# Patient Record
Sex: FEMALE | Race: BLACK OR AFRICAN AMERICAN | Employment: PART TIME | URBAN - METROPOLITAN AREA
[De-identification: names, ages, dates, MRNs, and addresses within clinical notes are randomized per-mention and may not be internally consistent; named-entity substitution may affect disease eponyms.]

---

## 2017-02-07 ENCOUNTER — OFFICE VISIT (OUTPATIENT)
Dept: PRIMARY CARE CLINIC | Age: 72
End: 2017-02-07

## 2017-02-07 VITALS
DIASTOLIC BLOOD PRESSURE: 85 MMHG | WEIGHT: 158 LBS | HEART RATE: 69 BPM | SYSTOLIC BLOOD PRESSURE: 135 MMHG | TEMPERATURE: 97.4 F | RESPIRATION RATE: 18 BRPM | BODY MASS INDEX: 26.29 KG/M2 | OXYGEN SATURATION: 98 %

## 2017-02-07 DIAGNOSIS — R21 RASH OF HANDS: Primary | ICD-10-CM

## 2017-02-07 PROCEDURE — 3017F COLORECTAL CA SCREEN DOC REV: CPT | Performed by: INTERNAL MEDICINE

## 2017-02-07 PROCEDURE — G8399 PT W/DXA RESULTS DOCUMENT: HCPCS | Performed by: INTERNAL MEDICINE

## 2017-02-07 PROCEDURE — 4040F PNEUMOC VAC/ADMIN/RCVD: CPT | Performed by: INTERNAL MEDICINE

## 2017-02-07 PROCEDURE — G8484 FLU IMMUNIZE NO ADMIN: HCPCS | Performed by: INTERNAL MEDICINE

## 2017-02-07 PROCEDURE — 99214 OFFICE O/P EST MOD 30 MIN: CPT | Performed by: INTERNAL MEDICINE

## 2017-02-07 PROCEDURE — 3014F SCREEN MAMMO DOC REV: CPT | Performed by: INTERNAL MEDICINE

## 2017-02-07 PROCEDURE — G8420 CALC BMI NORM PARAMETERS: HCPCS | Performed by: INTERNAL MEDICINE

## 2017-02-07 PROCEDURE — 1036F TOBACCO NON-USER: CPT | Performed by: INTERNAL MEDICINE

## 2017-02-07 PROCEDURE — 1090F PRES/ABSN URINE INCON ASSESS: CPT | Performed by: INTERNAL MEDICINE

## 2017-02-07 PROCEDURE — G8427 DOCREV CUR MEDS BY ELIG CLIN: HCPCS | Performed by: INTERNAL MEDICINE

## 2017-02-07 PROCEDURE — 1123F ACP DISCUSS/DSCN MKR DOCD: CPT | Performed by: INTERNAL MEDICINE

## 2017-02-07 RX ORDER — BETAMETHASONE DIPROPIONATE 0.05 %
OINTMENT (GRAM) TOPICAL
Qty: 50 G | Refills: 1 | Status: SHIPPED | OUTPATIENT
Start: 2017-02-07 | End: 2017-12-26

## 2017-02-07 RX ORDER — HYDROXYZINE HYDROCHLORIDE 25 MG/1
25 TABLET, FILM COATED ORAL 3 TIMES DAILY PRN
Qty: 60 TABLET | Refills: 1 | Status: SHIPPED | OUTPATIENT
Start: 2017-02-07 | End: 2017-02-17

## 2017-02-07 ASSESSMENT — PATIENT HEALTH QUESTIONNAIRE - PHQ9
2. FEELING DOWN, DEPRESSED OR HOPELESS: 0
SUM OF ALL RESPONSES TO PHQ9 QUESTIONS 1 & 2: 0
SUM OF ALL RESPONSES TO PHQ QUESTIONS 1-9: 0
1. LITTLE INTEREST OR PLEASURE IN DOING THINGS: 0

## 2017-02-07 ASSESSMENT — ENCOUNTER SYMPTOMS
SHORTNESS OF BREATH: 0
GASTROINTESTINAL NEGATIVE: 1
EYES NEGATIVE: 1
COUGH: 0
NAIL CHANGES: 0
RESPIRATORY NEGATIVE: 1
RHINORRHEA: 0
SORE THROAT: 0
DIARRHEA: 0

## 2017-03-13 DIAGNOSIS — G47.00 INSOMNIA, UNSPECIFIED TYPE: ICD-10-CM

## 2017-03-14 RX ORDER — ZOLPIDEM TARTRATE 10 MG/1
TABLET ORAL
Qty: 30 TABLET | Refills: 0 | Status: SHIPPED | OUTPATIENT
Start: 2017-03-14 | End: 2017-05-18 | Stop reason: SDUPTHER

## 2017-03-23 DIAGNOSIS — M54.50 ACUTE MIDLINE LOW BACK PAIN WITHOUT SCIATICA: ICD-10-CM

## 2017-03-24 RX ORDER — IBUPROFEN 600 MG/1
TABLET ORAL
Qty: 90 TABLET | Refills: 0 | Status: SHIPPED | OUTPATIENT
Start: 2017-03-24 | End: 2017-11-27 | Stop reason: DRUGHIGH

## 2017-05-18 DIAGNOSIS — G47.00 INSOMNIA, UNSPECIFIED TYPE: ICD-10-CM

## 2017-05-18 RX ORDER — ZOLPIDEM TARTRATE 10 MG/1
TABLET ORAL
Qty: 30 TABLET | Refills: 0 | Status: SHIPPED | OUTPATIENT
Start: 2017-05-18 | End: 2017-08-10 | Stop reason: SDUPTHER

## 2017-08-10 ENCOUNTER — OFFICE VISIT (OUTPATIENT)
Dept: PRIMARY CARE CLINIC | Age: 72
End: 2017-08-10

## 2017-08-10 VITALS
DIASTOLIC BLOOD PRESSURE: 77 MMHG | BODY MASS INDEX: 28.16 KG/M2 | HEART RATE: 76 BPM | HEIGHT: 65 IN | SYSTOLIC BLOOD PRESSURE: 132 MMHG | WEIGHT: 169 LBS | OXYGEN SATURATION: 96 % | TEMPERATURE: 97.7 F

## 2017-08-10 DIAGNOSIS — M54.2 NECK PAIN: Primary | ICD-10-CM

## 2017-08-10 DIAGNOSIS — G47.00 INSOMNIA, UNSPECIFIED TYPE: ICD-10-CM

## 2017-08-10 PROCEDURE — 1123F ACP DISCUSS/DSCN MKR DOCD: CPT | Performed by: NURSE PRACTITIONER

## 2017-08-10 PROCEDURE — 3014F SCREEN MAMMO DOC REV: CPT | Performed by: NURSE PRACTITIONER

## 2017-08-10 PROCEDURE — G8427 DOCREV CUR MEDS BY ELIG CLIN: HCPCS | Performed by: NURSE PRACTITIONER

## 2017-08-10 PROCEDURE — 1090F PRES/ABSN URINE INCON ASSESS: CPT | Performed by: NURSE PRACTITIONER

## 2017-08-10 PROCEDURE — G8399 PT W/DXA RESULTS DOCUMENT: HCPCS | Performed by: NURSE PRACTITIONER

## 2017-08-10 PROCEDURE — G8419 CALC BMI OUT NRM PARAM NOF/U: HCPCS | Performed by: NURSE PRACTITIONER

## 2017-08-10 PROCEDURE — 1036F TOBACCO NON-USER: CPT | Performed by: NURSE PRACTITIONER

## 2017-08-10 PROCEDURE — 3017F COLORECTAL CA SCREEN DOC REV: CPT | Performed by: NURSE PRACTITIONER

## 2017-08-10 PROCEDURE — 99213 OFFICE O/P EST LOW 20 MIN: CPT | Performed by: NURSE PRACTITIONER

## 2017-08-10 PROCEDURE — 4040F PNEUMOC VAC/ADMIN/RCVD: CPT | Performed by: NURSE PRACTITIONER

## 2017-08-10 RX ORDER — ZOLPIDEM TARTRATE 10 MG/1
TABLET ORAL
Qty: 30 TABLET | Refills: 0 | Status: SHIPPED | OUTPATIENT
Start: 2017-08-10 | End: 2017-10-11 | Stop reason: SDUPTHER

## 2017-08-10 RX ORDER — PREDNISONE 1 MG/1
TABLET ORAL
Qty: 36 TABLET | Refills: 0 | Status: SHIPPED | OUTPATIENT
Start: 2017-08-10 | End: 2017-12-26

## 2017-08-10 RX ORDER — BACLOFEN 10 MG/1
10 TABLET ORAL 2 TIMES DAILY
Qty: 40 TABLET | Refills: 0 | Status: SHIPPED | OUTPATIENT
Start: 2017-08-10 | End: 2017-12-26

## 2017-08-10 ASSESSMENT — ENCOUNTER SYMPTOMS
NAUSEA: 0
TROUBLE SWALLOWING: 0
SORE THROAT: 0
CHEST TIGHTNESS: 0
VOMITING: 0
WHEEZING: 0
VOICE CHANGE: 0
COUGH: 0
SHORTNESS OF BREATH: 0
PHOTOPHOBIA: 0

## 2017-10-10 ENCOUNTER — TELEPHONE (OUTPATIENT)
Dept: PRIMARY CARE CLINIC | Age: 72
End: 2017-10-10

## 2017-10-10 NOTE — TELEPHONE ENCOUNTER
Pt requesting refill of zolpidem. Last refill was 8/10   Last visit 8/10         [address] [facility] and [address] in [place].  Healthy

## 2017-10-11 DIAGNOSIS — G47.00 INSOMNIA, UNSPECIFIED TYPE: ICD-10-CM

## 2017-10-11 RX ORDER — ZOLPIDEM TARTRATE 10 MG/1
TABLET ORAL
Qty: 30 TABLET | Refills: 0 | Status: SHIPPED | OUTPATIENT
Start: 2017-10-11 | End: 2017-11-27 | Stop reason: SDUPTHER

## 2017-10-25 DIAGNOSIS — L24.5 IRRITANT CONTACT DERMATITIS DUE TO OTHER CHEMICAL PRODUCTS: ICD-10-CM

## 2017-10-26 RX ORDER — CLOBETASOL PROPIONATE 0.5 MG/G
OINTMENT TOPICAL
Qty: 30 G | Refills: 0 | Status: SHIPPED | OUTPATIENT
Start: 2017-10-26 | End: 2017-12-26

## 2017-11-27 ENCOUNTER — OFFICE VISIT (OUTPATIENT)
Dept: PRIMARY CARE CLINIC | Age: 72
End: 2017-11-27

## 2017-11-27 VITALS
HEIGHT: 65 IN | DIASTOLIC BLOOD PRESSURE: 89 MMHG | WEIGHT: 173.2 LBS | OXYGEN SATURATION: 97 % | BODY MASS INDEX: 28.86 KG/M2 | SYSTOLIC BLOOD PRESSURE: 167 MMHG | HEART RATE: 66 BPM | TEMPERATURE: 97 F

## 2017-11-27 DIAGNOSIS — F43.9 FEELING STRESSED OUT: ICD-10-CM

## 2017-11-27 DIAGNOSIS — G47.00 INSOMNIA, UNSPECIFIED TYPE: ICD-10-CM

## 2017-11-27 DIAGNOSIS — M79.601 RIGHT ARM PAIN: Primary | ICD-10-CM

## 2017-11-27 PROCEDURE — 1090F PRES/ABSN URINE INCON ASSESS: CPT | Performed by: FAMILY MEDICINE

## 2017-11-27 PROCEDURE — G8399 PT W/DXA RESULTS DOCUMENT: HCPCS | Performed by: FAMILY MEDICINE

## 2017-11-27 PROCEDURE — G8419 CALC BMI OUT NRM PARAM NOF/U: HCPCS | Performed by: FAMILY MEDICINE

## 2017-11-27 PROCEDURE — G8427 DOCREV CUR MEDS BY ELIG CLIN: HCPCS | Performed by: FAMILY MEDICINE

## 2017-11-27 PROCEDURE — 1036F TOBACCO NON-USER: CPT | Performed by: FAMILY MEDICINE

## 2017-11-27 PROCEDURE — 3014F SCREEN MAMMO DOC REV: CPT | Performed by: FAMILY MEDICINE

## 2017-11-27 PROCEDURE — 1123F ACP DISCUSS/DSCN MKR DOCD: CPT | Performed by: FAMILY MEDICINE

## 2017-11-27 PROCEDURE — 3017F COLORECTAL CA SCREEN DOC REV: CPT | Performed by: FAMILY MEDICINE

## 2017-11-27 PROCEDURE — 99214 OFFICE O/P EST MOD 30 MIN: CPT | Performed by: FAMILY MEDICINE

## 2017-11-27 PROCEDURE — 4040F PNEUMOC VAC/ADMIN/RCVD: CPT | Performed by: FAMILY MEDICINE

## 2017-11-27 PROCEDURE — G8484 FLU IMMUNIZE NO ADMIN: HCPCS | Performed by: FAMILY MEDICINE

## 2017-11-27 RX ORDER — ZOLPIDEM TARTRATE 10 MG/1
TABLET ORAL
Qty: 30 TABLET | Refills: 0 | Status: SHIPPED | OUTPATIENT
Start: 2017-11-27 | End: 2018-03-06 | Stop reason: SDUPTHER

## 2017-11-27 RX ORDER — IBUPROFEN 400 MG/1
TABLET ORAL
Qty: 120 TABLET | Refills: 3 | Status: SHIPPED | OUTPATIENT
Start: 2017-11-27 | End: 2018-03-06 | Stop reason: ALTCHOICE

## 2017-11-27 ASSESSMENT — ENCOUNTER SYMPTOMS
COUGH: 0
EYE PAIN: 0
APNEA: 0
SINUS PRESSURE: 0
DIARRHEA: 0
ABDOMINAL DISTENTION: 0
TROUBLE SWALLOWING: 0
NAUSEA: 0
RECTAL PAIN: 0
VOMITING: 0
BLOOD IN STOOL: 0
STRIDOR: 0
EYE REDNESS: 0
SHORTNESS OF BREATH: 0
CHOKING: 0
EYE ITCHING: 0
WHEEZING: 0
CONSTIPATION: 0
EYE DISCHARGE: 0
SORE THROAT: 0
CHEST TIGHTNESS: 0
PHOTOPHOBIA: 0
BACK PAIN: 0
RHINORRHEA: 0
COLOR CHANGE: 0

## 2017-11-27 NOTE — PROGRESS NOTES
Gastrointestinal: Negative for abdominal distention, blood in stool, constipation, diarrhea, nausea, rectal pain and vomiting. Genitourinary: Negative for decreased urine volume, difficulty urinating, dyspareunia, dysuria, enuresis, flank pain, frequency, genital sores, hematuria, menstrual problem, pelvic pain, urgency, vaginal bleeding and vaginal pain. Musculoskeletal: Negative for arthralgias, back pain, gait problem, joint swelling, myalgias, neck pain and neck stiffness. Left arm pain   Skin: Negative for color change, pallor, rash and wound. Neurological: Negative for dizziness, tremors, seizures, syncope, facial asymmetry, speech difficulty, weakness, light-headedness, numbness and headaches. Hematological: Negative for adenopathy. Does not bruise/bleed easily. Psychiatric/Behavioral: Negative for agitation, behavioral problems, confusion, decreased concentration, dysphoric mood, hallucinations, self-injury, sleep disturbance and suicidal ideas. The patient is not nervous/anxious and is not hyperactive. Objective:   Physical Exam   Constitutional: She is oriented to person, place, and time. She appears well-developed and well-nourished. No distress. HENT:   Head: Normocephalic and atraumatic. Right Ear: External ear normal.   Left Ear: External ear normal.   Nose: Nose normal.   Mouth/Throat: Oropharynx is clear and moist. No oropharyngeal exudate. Eyes: Conjunctivae and EOM are normal. Pupils are equal, round, and reactive to light. Left eye exhibits no discharge. No scleral icterus. Neck: Normal range of motion. Neck supple. No JVD present. No tracheal deviation present. No thyromegaly present. Cardiovascular: Normal rate, regular rhythm, normal heart sounds and intact distal pulses. Exam reveals no gallop and no friction rub. No murmur heard. Pulmonary/Chest: Effort normal and breath sounds normal. No stridor. No respiratory distress. She has no wheezes.  She has no rales. She exhibits no tenderness. Abdominal: Soft. Bowel sounds are normal. She exhibits no distension and no mass. There is no tenderness. There is no rebound and no guarding. Musculoskeletal: Normal range of motion. She exhibits no edema or tenderness. Minimal tenderness of the biceps are R arm   Lymphadenopathy:     She has no cervical adenopathy. Neurological: She is alert and oriented to person, place, and time. She has normal reflexes. No cranial nerve deficit. Coordination normal.   Skin: Skin is warm and dry. No rash noted. She is not diaphoretic. No erythema. No pallor. Psychiatric: She has a normal mood and affect. Her behavior is normal. Judgment and thought content normal.   Nursing note and vitals reviewed. Assessment:      1. Right arm pain  New job 2 months  Muscular vs dvt  Add nsaid  - Ultrasound doppler venous arm right; Future  - ibuprofen (ADVIL;MOTRIN) 400 MG tablet; One tablet 3 times a day as needed  Dispense: 120 tablet; Refill: 3    2. Feeling stressed out  Recent death of   Samia King not working    Needs fu 2 weeks  - sertraline (ZOLOFT) 50 MG tablet; Take 1 tablet by mouth daily  Dispense: 30 tablet;  Refill: 3    bp up now  Normal last vist  Need fu blood pressure ck 1-2 weeks  No salt added      Plan:

## 2017-11-28 ENCOUNTER — HOSPITAL ENCOUNTER (OUTPATIENT)
Dept: VASCULAR LAB | Age: 72
Discharge: HOME | End: 2017-11-28
Attending: FAMILY MEDICINE | Admitting: FAMILY MEDICINE

## 2017-11-28 DIAGNOSIS — M79.601 PAIN OF RIGHT ARM: ICD-10-CM

## 2017-12-26 ENCOUNTER — OFFICE VISIT (OUTPATIENT)
Dept: PRIMARY CARE CLINIC | Age: 72
End: 2017-12-26

## 2017-12-26 VITALS
HEIGHT: 65 IN | TEMPERATURE: 97.3 F | OXYGEN SATURATION: 97 % | WEIGHT: 168 LBS | DIASTOLIC BLOOD PRESSURE: 90 MMHG | SYSTOLIC BLOOD PRESSURE: 160 MMHG | BODY MASS INDEX: 27.99 KG/M2 | HEART RATE: 60 BPM

## 2017-12-26 DIAGNOSIS — Z23 NEED FOR TDAP VACCINATION: ICD-10-CM

## 2017-12-26 DIAGNOSIS — Z00.00 PHYSICAL EXAM: ICD-10-CM

## 2017-12-26 DIAGNOSIS — E55.9 VITAMIN D DEFICIENCY: ICD-10-CM

## 2017-12-26 DIAGNOSIS — F41.9 ANXIETY: ICD-10-CM

## 2017-12-26 DIAGNOSIS — Z12.11 SCREEN FOR COLON CANCER: ICD-10-CM

## 2017-12-26 DIAGNOSIS — Z12.31 ENCOUNTER FOR SCREENING MAMMOGRAM FOR HIGH-RISK PATIENT: ICD-10-CM

## 2017-12-26 DIAGNOSIS — I10 ESSENTIAL HYPERTENSION: ICD-10-CM

## 2017-12-26 DIAGNOSIS — Z23 FLU VACCINE NEED: ICD-10-CM

## 2017-12-26 LAB
A/G RATIO: 1.5 (ref 1.1–2.2)
ALBUMIN SERPL-MCNC: 4.1 G/DL (ref 3.4–5)
ALP BLD-CCNC: 84 U/L (ref 40–129)
ALT SERPL-CCNC: 9 U/L (ref 10–40)
ANION GAP SERPL CALCULATED.3IONS-SCNC: 14 MMOL/L (ref 3–16)
AST SERPL-CCNC: 14 U/L (ref 15–37)
BASOPHILS ABSOLUTE: 0 K/UL (ref 0–0.2)
BASOPHILS RELATIVE PERCENT: 0.9 %
BILIRUB SERPL-MCNC: <0.2 MG/DL (ref 0–1)
BILIRUBIN URINE: NEGATIVE
BLOOD, URINE: NEGATIVE
BUN BLDV-MCNC: 17 MG/DL (ref 7–20)
CALCIUM SERPL-MCNC: 9.6 MG/DL (ref 8.3–10.6)
CHLORIDE BLD-SCNC: 105 MMOL/L (ref 99–110)
CHOLESTEROL, TOTAL: 200 MG/DL (ref 0–199)
CLARITY: CLEAR
CO2: 26 MMOL/L (ref 21–32)
COLOR: YELLOW
CREAT SERPL-MCNC: 0.8 MG/DL (ref 0.6–1.2)
EOSINOPHILS ABSOLUTE: 0.3 K/UL (ref 0–0.6)
EOSINOPHILS RELATIVE PERCENT: 5 %
GFR AFRICAN AMERICAN: >60
GFR NON-AFRICAN AMERICAN: >60
GLOBULIN: 2.7 G/DL
GLUCOSE BLD-MCNC: 77 MG/DL (ref 70–99)
GLUCOSE URINE: NEGATIVE MG/DL
HCT VFR BLD CALC: 39.5 % (ref 36–48)
HDLC SERPL-MCNC: 60 MG/DL (ref 40–60)
HEMOGLOBIN: 13 G/DL (ref 12–16)
KETONES, URINE: NEGATIVE MG/DL
LDL CHOLESTEROL CALCULATED: 122 MG/DL
LEUKOCYTE ESTERASE, URINE: NEGATIVE
LYMPHOCYTES ABSOLUTE: 2.3 K/UL (ref 1–5.1)
LYMPHOCYTES RELATIVE PERCENT: 45.4 %
MCH RBC QN AUTO: 28.8 PG (ref 26–34)
MCHC RBC AUTO-ENTMCNC: 33 G/DL (ref 31–36)
MCV RBC AUTO: 87.5 FL (ref 80–100)
MICROSCOPIC EXAMINATION: NORMAL
MONOCYTES ABSOLUTE: 0.4 K/UL (ref 0–1.3)
MONOCYTES RELATIVE PERCENT: 8.6 %
NEUTROPHILS ABSOLUTE: 2 K/UL (ref 1.7–7.7)
NEUTROPHILS RELATIVE PERCENT: 40.1 %
NITRITE, URINE: NEGATIVE
PDW BLD-RTO: 14.9 % (ref 12.4–15.4)
PH UA: 5.5
PLATELET # BLD: 223 K/UL (ref 135–450)
PMV BLD AUTO: 9.6 FL (ref 5–10.5)
POTASSIUM SERPL-SCNC: 4.5 MMOL/L (ref 3.5–5.1)
PROTEIN UA: NEGATIVE MG/DL
RBC # BLD: 4.52 M/UL (ref 4–5.2)
SODIUM BLD-SCNC: 145 MMOL/L (ref 136–145)
SPECIFIC GRAVITY UA: 1.02
TOTAL PROTEIN: 6.8 G/DL (ref 6.4–8.2)
TRIGL SERPL-MCNC: 92 MG/DL (ref 0–150)
TSH SERPL DL<=0.05 MIU/L-ACNC: 1.32 UIU/ML (ref 0.27–4.2)
URINE TYPE: NORMAL
UROBILINOGEN, URINE: 0.2 E.U./DL
VITAMIN D 25-HYDROXY: 17.5 NG/ML
VLDLC SERPL CALC-MCNC: 18 MG/DL
WBC # BLD: 5.1 K/UL (ref 4–11)

## 2017-12-26 PROCEDURE — 3014F SCREEN MAMMO DOC REV: CPT | Performed by: INTERNAL MEDICINE

## 2017-12-26 PROCEDURE — 1123F ACP DISCUSS/DSCN MKR DOCD: CPT | Performed by: INTERNAL MEDICINE

## 2017-12-26 PROCEDURE — 99214 OFFICE O/P EST MOD 30 MIN: CPT | Performed by: INTERNAL MEDICINE

## 2017-12-26 PROCEDURE — G8399 PT W/DXA RESULTS DOCUMENT: HCPCS | Performed by: INTERNAL MEDICINE

## 2017-12-26 PROCEDURE — 4040F PNEUMOC VAC/ADMIN/RCVD: CPT | Performed by: INTERNAL MEDICINE

## 2017-12-26 PROCEDURE — G8484 FLU IMMUNIZE NO ADMIN: HCPCS | Performed by: INTERNAL MEDICINE

## 2017-12-26 PROCEDURE — G8419 CALC BMI OUT NRM PARAM NOF/U: HCPCS | Performed by: INTERNAL MEDICINE

## 2017-12-26 PROCEDURE — 3017F COLORECTAL CA SCREEN DOC REV: CPT | Performed by: INTERNAL MEDICINE

## 2017-12-26 PROCEDURE — 1090F PRES/ABSN URINE INCON ASSESS: CPT | Performed by: INTERNAL MEDICINE

## 2017-12-26 PROCEDURE — G8427 DOCREV CUR MEDS BY ELIG CLIN: HCPCS | Performed by: INTERNAL MEDICINE

## 2017-12-26 PROCEDURE — 1036F TOBACCO NON-USER: CPT | Performed by: INTERNAL MEDICINE

## 2017-12-26 RX ORDER — HYDROCHLOROTHIAZIDE 25 MG/1
25 TABLET ORAL DAILY
Qty: 30 TABLET | Refills: 3 | Status: SHIPPED | OUTPATIENT
Start: 2017-12-26 | End: 2018-03-06 | Stop reason: SDUPTHER

## 2017-12-26 ASSESSMENT — ENCOUNTER SYMPTOMS
SHORTNESS OF BREATH: 0
DIARRHEA: 0
NAUSEA: 0
ABDOMINAL PAIN: 0
GASTROINTESTINAL NEGATIVE: 1
COUGH: 0
CONSTIPATION: 0
CHEST TIGHTNESS: 0
EYES NEGATIVE: 1
WHEEZING: 0
ABDOMINAL DISTENTION: 0

## 2017-12-26 NOTE — PROGRESS NOTES
84    Current Flu and Pneumonia Vax  Set targets for weight loss 4 lbs per month  Exercise 3-5 times per week  Keep check of weight  Weighting machine    On a scale of 1 to 5 how confident are you in these goals? 4/5     Education materials given

## 2017-12-27 LAB
ESTIMATED AVERAGE GLUCOSE: 116.9 MG/DL
HBA1C MFR BLD: 5.7 %

## 2017-12-27 RX ORDER — ERGOCALCIFEROL (VITAMIN D2) 1250 MCG
50000 CAPSULE ORAL WEEKLY
Qty: 4 CAPSULE | Refills: 3 | Status: SHIPPED | OUTPATIENT
Start: 2017-12-27 | End: 2018-03-06 | Stop reason: SDUPTHER

## 2018-02-01 DIAGNOSIS — R21 RASH OF HANDS: ICD-10-CM

## 2018-02-02 ENCOUNTER — TELEPHONE (OUTPATIENT)
Dept: ENT CLINIC | Age: 73
End: 2018-02-02

## 2018-02-05 RX ORDER — BETAMETHASONE DIPROPIONATE 0.05 %
OINTMENT (GRAM) TOPICAL
Qty: 45 G | Refills: 0 | Status: SHIPPED | OUTPATIENT
Start: 2018-02-05 | End: 2019-02-14

## 2018-03-06 ENCOUNTER — OFFICE VISIT (OUTPATIENT)
Dept: PRIMARY CARE CLINIC | Age: 73
End: 2018-03-06

## 2018-03-06 VITALS
SYSTOLIC BLOOD PRESSURE: 140 MMHG | BODY MASS INDEX: 28.66 KG/M2 | WEIGHT: 172 LBS | TEMPERATURE: 96.8 F | HEIGHT: 65 IN | DIASTOLIC BLOOD PRESSURE: 88 MMHG | HEART RATE: 95 BPM | RESPIRATION RATE: 16 BRPM | OXYGEN SATURATION: 97 %

## 2018-03-06 DIAGNOSIS — M79.604 PAIN OF RIGHT LOWER EXTREMITY: ICD-10-CM

## 2018-03-06 DIAGNOSIS — G89.29 CHRONIC PAIN OF RIGHT KNEE: Primary | ICD-10-CM

## 2018-03-06 DIAGNOSIS — I10 ESSENTIAL HYPERTENSION: ICD-10-CM

## 2018-03-06 DIAGNOSIS — E55.9 VITAMIN D DEFICIENCY: ICD-10-CM

## 2018-03-06 DIAGNOSIS — G47.00 INSOMNIA, UNSPECIFIED TYPE: ICD-10-CM

## 2018-03-06 DIAGNOSIS — M25.561 CHRONIC PAIN OF RIGHT KNEE: Primary | ICD-10-CM

## 2018-03-06 DIAGNOSIS — M19.079 ARTHRITIS OF ANKLE: Primary | ICD-10-CM

## 2018-03-06 DIAGNOSIS — F41.9 ANXIETY: ICD-10-CM

## 2018-03-06 PROCEDURE — 99214 OFFICE O/P EST MOD 30 MIN: CPT | Performed by: FAMILY MEDICINE

## 2018-03-06 RX ORDER — ZOLPIDEM TARTRATE 10 MG/1
TABLET ORAL
Qty: 30 TABLET | Refills: 0 | Status: SHIPPED | OUTPATIENT
Start: 2018-03-06 | End: 2018-04-06

## 2018-03-06 RX ORDER — IBUPROFEN 400 MG/1
TABLET ORAL
Qty: 120 TABLET | Refills: 3 | Status: CANCELLED | OUTPATIENT
Start: 2018-03-06

## 2018-03-06 RX ORDER — BETAMETHASONE DIPROPIONATE 0.05 %
OINTMENT (GRAM) TOPICAL
Qty: 45 G | Refills: 0 | Status: CANCELLED | OUTPATIENT
Start: 2018-03-06

## 2018-03-06 RX ORDER — ERGOCALCIFEROL (VITAMIN D2) 1250 MCG
50000 CAPSULE ORAL WEEKLY
Qty: 4 CAPSULE | Refills: 3 | Status: SHIPPED | OUTPATIENT
Start: 2018-03-06 | End: 2018-03-06 | Stop reason: SDUPTHER

## 2018-03-06 RX ORDER — HYDROCHLOROTHIAZIDE 25 MG/1
25 TABLET ORAL DAILY
Qty: 30 TABLET | Refills: 3 | Status: SHIPPED | OUTPATIENT
Start: 2018-03-06 | End: 2018-03-06 | Stop reason: SDUPTHER

## 2018-03-06 RX ORDER — METHYLPREDNISOLONE 4 MG/1
TABLET ORAL
Qty: 21 TABLET | Refills: 0 | Status: SHIPPED | OUTPATIENT
Start: 2018-03-06 | End: 2018-03-12

## 2018-03-06 RX ORDER — NAPROXEN 500 MG/1
TABLET ORAL
Qty: 60 TABLET | Refills: 3 | Status: SHIPPED | OUTPATIENT
Start: 2018-03-06 | End: 2019-02-14

## 2018-03-06 ASSESSMENT — PATIENT HEALTH QUESTIONNAIRE - PHQ9
2. FEELING DOWN, DEPRESSED OR HOPELESS: 0
SUM OF ALL RESPONSES TO PHQ9 QUESTIONS 1 & 2: 0
1. LITTLE INTEREST OR PLEASURE IN DOING THINGS: 0
SUM OF ALL RESPONSES TO PHQ QUESTIONS 1-9: 0

## 2018-03-06 ASSESSMENT — ENCOUNTER SYMPTOMS
CHOKING: 0
RHINORRHEA: 0
EYE ITCHING: 0
PHOTOPHOBIA: 0
CONSTIPATION: 0
EYE PAIN: 0
NAUSEA: 0
SORE THROAT: 0
ABDOMINAL DISTENTION: 0
COLOR CHANGE: 0
WHEEZING: 0
SINUS PRESSURE: 0
BACK PAIN: 0
EYE DISCHARGE: 0
APNEA: 0
RECTAL PAIN: 0
BLOOD IN STOOL: 0
SHORTNESS OF BREATH: 0
EYE REDNESS: 0
VOMITING: 0
DIARRHEA: 0
CHEST TIGHTNESS: 0
COUGH: 0
STRIDOR: 0
TROUBLE SWALLOWING: 0

## 2018-03-06 NOTE — PROGRESS NOTES
enuresis, flank pain, frequency, genital sores, hematuria, menstrual problem, pelvic pain, urgency, vaginal bleeding and vaginal pain. Musculoskeletal: Negative for arthralgias, back pain, gait problem, joint swelling, myalgias, neck pain and neck stiffness. Right knee and leg pain   Skin: Negative for color change, pallor, rash and wound. Neurological: Negative for dizziness, tremors, seizures, syncope, facial asymmetry, speech difficulty, weakness, light-headedness, numbness and headaches. Hematological: Negative for adenopathy. Does not bruise/bleed easily. Psychiatric/Behavioral: Negative for agitation, behavioral problems, confusion, decreased concentration, dysphoric mood, hallucinations, self-injury, sleep disturbance and suicidal ideas. The patient is not nervous/anxious and is not hyperactive. Objective:   Physical Exam   Constitutional: She is oriented to person, place, and time. She appears well-developed and well-nourished. No distress. HENT:   Head: Normocephalic and atraumatic. Right Ear: External ear normal.   Left Ear: External ear normal.   Nose: Nose normal.   Mouth/Throat: Oropharynx is clear and moist. No oropharyngeal exudate. Eyes: Conjunctivae and EOM are normal. Pupils are equal, round, and reactive to light. Left eye exhibits no discharge. No scleral icterus. Neck: Normal range of motion. Neck supple. No JVD present. No tracheal deviation present. No thyromegaly present. Cardiovascular: Normal rate, regular rhythm, normal heart sounds and intact distal pulses. Exam reveals no gallop and no friction rub. No murmur heard. Pulmonary/Chest: Effort normal and breath sounds normal. No stridor. No respiratory distress. She has no wheezes. She has no rales. She exhibits no tenderness. Abdominal: Soft. Bowel sounds are normal. She exhibits no distension and no mass. There is no tenderness. There is no rebound and no guarding.    Musculoskeletal: Normal range of

## 2018-03-07 RX ORDER — HYDROCHLOROTHIAZIDE 25 MG/1
25 TABLET ORAL DAILY
Qty: 90 TABLET | Refills: 1 | Status: SHIPPED | OUTPATIENT
Start: 2018-03-07 | End: 2019-02-14 | Stop reason: SDUPTHER

## 2018-03-07 RX ORDER — ERGOCALCIFEROL 1.25 MG/1
CAPSULE ORAL
Qty: 12 CAPSULE | Refills: 1 | Status: SHIPPED | OUTPATIENT
Start: 2018-03-07 | End: 2019-02-14

## 2018-04-10 ENCOUNTER — TELEPHONE (OUTPATIENT)
Dept: PRIMARY CARE CLINIC | Age: 73
End: 2018-04-10

## 2018-04-10 ENCOUNTER — OFFICE VISIT (OUTPATIENT)
Dept: PRIMARY CARE CLINIC | Age: 73
End: 2018-04-10

## 2018-04-10 VITALS
SYSTOLIC BLOOD PRESSURE: 120 MMHG | HEIGHT: 65 IN | DIASTOLIC BLOOD PRESSURE: 70 MMHG | WEIGHT: 165 LBS | OXYGEN SATURATION: 98 % | BODY MASS INDEX: 27.49 KG/M2 | TEMPERATURE: 97.3 F | HEART RATE: 86 BPM

## 2018-04-10 DIAGNOSIS — J06.9 ACUTE URI: ICD-10-CM

## 2018-04-10 DIAGNOSIS — Z23 NEED FOR SHINGLES VACCINE: ICD-10-CM

## 2018-04-10 PROCEDURE — 99213 OFFICE O/P EST LOW 20 MIN: CPT | Performed by: INTERNAL MEDICINE

## 2018-04-10 RX ORDER — AZITHROMYCIN 250 MG/1
TABLET, FILM COATED ORAL
Qty: 1 PACKET | Refills: 0 | Status: SHIPPED | OUTPATIENT
Start: 2018-04-10 | End: 2018-04-20

## 2018-04-10 RX ORDER — GUAIFENESIN 600 MG/1
600 TABLET, EXTENDED RELEASE ORAL 2 TIMES DAILY
Qty: 12 TABLET | Refills: 0 | Status: SHIPPED | OUTPATIENT
Start: 2018-04-10 | End: 2019-02-14

## 2018-04-10 RX ORDER — FLUTICASONE PROPIONATE 50 MCG
1 SPRAY, SUSPENSION (ML) NASAL DAILY
Qty: 1 BOTTLE | Refills: 0 | Status: SHIPPED | OUTPATIENT
Start: 2018-04-10 | End: 2019-02-14

## 2018-04-10 ASSESSMENT — ENCOUNTER SYMPTOMS
COUGH: 1
GASTROINTESTINAL NEGATIVE: 1
ABDOMINAL PAIN: 0
WHEEZING: 0
NAUSEA: 0
RHINORRHEA: 1
EYES NEGATIVE: 1
SHORTNESS OF BREATH: 0
ABDOMINAL DISTENTION: 0
CONSTIPATION: 0
DIARRHEA: 0
CHEST TIGHTNESS: 0
SINUS PRESSURE: 1

## 2018-06-06 ENCOUNTER — TELEPHONE (OUTPATIENT)
Dept: PRIMARY CARE CLINIC | Age: 73
End: 2018-06-06

## 2018-06-12 ENCOUNTER — TELEPHONE (OUTPATIENT)
Dept: PRIMARY CARE CLINIC | Age: 73
End: 2018-06-12

## 2018-06-19 ENCOUNTER — TELEPHONE (OUTPATIENT)
Dept: PRIMARY CARE CLINIC | Age: 73
End: 2018-06-19

## 2018-06-25 ENCOUNTER — HOSPITAL ENCOUNTER (OUTPATIENT)
Dept: MAMMOGRAPHY | Age: 73
Discharge: HOME | End: 2018-06-26
Attending: INTERNAL MEDICINE | Admitting: INTERNAL MEDICINE

## 2018-06-25 DIAGNOSIS — Z12.31 VISIT FOR SCREENING MAMMOGRAM: ICD-10-CM

## 2018-07-06 ENCOUNTER — TELEPHONE (OUTPATIENT)
Dept: PRIMARY CARE CLINIC | Age: 73
End: 2018-07-06

## 2018-09-04 ENCOUNTER — TELEPHONE (OUTPATIENT)
Dept: PRIMARY CARE CLINIC | Age: 73
End: 2018-09-04

## 2018-09-04 DIAGNOSIS — M17.0 PRIMARY OSTEOARTHRITIS OF BOTH KNEES: Primary | ICD-10-CM

## 2018-09-05 ENCOUNTER — OFFICE VISIT (OUTPATIENT)
Dept: ORTHOPEDIC SURGERY | Age: 73
End: 2018-09-05

## 2018-09-05 VITALS
TEMPERATURE: 97.8 F | BODY MASS INDEX: 29.16 KG/M2 | SYSTOLIC BLOOD PRESSURE: 176 MMHG | HEIGHT: 65 IN | DIASTOLIC BLOOD PRESSURE: 90 MMHG | HEART RATE: 60 BPM | WEIGHT: 175 LBS

## 2018-09-05 DIAGNOSIS — M25.561 PAIN IN BOTH KNEES, UNSPECIFIED CHRONICITY: Primary | ICD-10-CM

## 2018-09-05 DIAGNOSIS — M17.0 ARTHRITIS OF BOTH KNEES: ICD-10-CM

## 2018-09-05 DIAGNOSIS — M25.562 PAIN IN BOTH KNEES, UNSPECIFIED CHRONICITY: Primary | ICD-10-CM

## 2018-09-05 PROCEDURE — 99203 OFFICE O/P NEW LOW 30 MIN: CPT | Performed by: PHYSICIAN ASSISTANT

## 2018-09-05 PROCEDURE — 20610 DRAIN/INJ JOINT/BURSA W/O US: CPT | Performed by: PHYSICIAN ASSISTANT

## 2018-09-05 NOTE — PROGRESS NOTES
Examination of the lower extremities shows intact perfusion to all extremities. No cyanosis. Digits are warm to touch, capillary refill is less than 2 seconds. no edema noted. Examination of the skin over both lower extremities reveals: The skin to be intact without lacerations or abrasions. No significant erythema. No rashes or skin lesions. X Rays: performed in the office today:   AP Standing, Lateral and Sunrise views of bilateral knee: There is mild osteoarthritic findings of the left and right knee noted with joint space narrowing of the medial and lateral compartments. Early sclerosis. Chondrocalcinosis noted on her right lateral joint line. No acute fractures or dislocations noted. Additional Tests reviewed: none  Additional Outside Records reviewed: none    Diagnosis:       ICD-10-CM ICD-9-CM    1. Pain in both knees, unspecified chronicity M25.561 719.46 XR Knee Bilateral Standing    M25.562  XR KNEE LEFT (1-2 VIEWS)      XR KNEE RIGHT (1-2 VIEWS)   2. Arthritis of both knees M17.0 716.96 NC ARTHROCENTESIS ASPIR&/INJ MAJOR JT/BURSA W/O US      NC ARTHROCENTESIS ASPIR&/INJ MAJOR JT/BURSA W/O US      NC TRIAMCINOLONE ACETONIDE INJ        Assessment and Plan:     The natural history of the patient's diagnosis as well as the treatment options were discussed in full and questions were answered. Risks and benefits of the treatment options also reviewed in detail. She has mild degenerative arthritis of both left and right knee. She has chondrocalcinosis of the lateral meniscus right knee. Weight loss, activity modification, home exercise therapy program, NSAID'S, dietary changes have been discussed as a means to help control the symptoms. Non surgical options including cortisone injection, Visco supplementation injection,  Coolief (cooled frequency ablation of the geniculate nerves), stem cell injections, PRP injections were discussed.     Surgical option, arthroplasty

## 2018-11-07 ENCOUNTER — TELEPHONE (OUTPATIENT)
Dept: PRIMARY CARE CLINIC | Age: 73
End: 2018-11-07

## 2018-12-28 ENCOUNTER — APPOINTMENT (OUTPATIENT)
Dept: CT IMAGING | Age: 73
End: 2018-12-28
Payer: MEDICARE

## 2018-12-28 ENCOUNTER — HOSPITAL ENCOUNTER (EMERGENCY)
Age: 73
Discharge: HOME | End: 2018-12-28
Payer: MEDICARE

## 2018-12-28 ENCOUNTER — APPOINTMENT (OUTPATIENT)
Dept: GENERAL RADIOLOGY | Age: 73
End: 2018-12-28
Payer: MEDICARE

## 2018-12-28 VITALS
HEIGHT: 65 IN | WEIGHT: 172 LBS | DIASTOLIC BLOOD PRESSURE: 92 MMHG | HEART RATE: 90 BPM | OXYGEN SATURATION: 96 % | RESPIRATION RATE: 17 BRPM | SYSTOLIC BLOOD PRESSURE: 151 MMHG | TEMPERATURE: 98.5 F | BODY MASS INDEX: 28.66 KG/M2
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
## 2018-12-28 DIAGNOSIS — S16.1XXA STRAIN OF NECK MUSCLE, INITIAL ENCOUNTER: ICD-10-CM

## 2018-12-28 DIAGNOSIS — S09.90XA INJURY OF HEAD, INITIAL ENCOUNTER: ICD-10-CM

## 2018-12-28 PROCEDURE — 73030 X-RAY EXAM OF SHOULDER: CPT

## 2018-12-28 PROCEDURE — 72125 CT NECK SPINE W/O DYE: CPT

## 2018-12-28 PROCEDURE — 70450 CT HEAD/BRAIN W/O DYE: CPT

## 2018-12-28 PROCEDURE — 99284 EMERGENCY DEPT VISIT MOD MDM: CPT

## 2018-12-28 PROCEDURE — 6370000000 HC RX 637 (ALT 250 FOR IP): Performed by: PHYSICIAN ASSISTANT

## 2018-12-28 RX ORDER — CYCLOBENZAPRINE HCL 5 MG
5-10 TABLET ORAL 3 TIMES DAILY PRN
Qty: 12 TABLET | Refills: 0 | Status: SHIPPED | OUTPATIENT
Start: 2018-12-28 | End: 2019-01-07

## 2018-12-28 RX ORDER — LIDOCAINE 50 MG/G
1 PATCH TOPICAL DAILY
Qty: 30 PATCH | Refills: 0 | Status: SHIPPED | OUTPATIENT
Start: 2018-12-28 | End: 2019-02-14

## 2018-12-28 RX ORDER — LIDOCAINE 4 G/G
1 PATCH TOPICAL DAILY
Status: DISCONTINUED | OUTPATIENT
Start: 2018-12-28 | End: 2018-12-28 | Stop reason: HOSPADM

## 2018-12-28 RX ORDER — NAPROXEN 250 MG/1
500 TABLET ORAL ONCE
Status: COMPLETED | OUTPATIENT
Start: 2018-12-28 | End: 2018-12-28

## 2018-12-28 RX ORDER — HYDROCODONE BITARTRATE AND ACETAMINOPHEN 5; 325 MG/1; MG/1
1 TABLET ORAL EVERY 6 HOURS PRN
Qty: 6 TABLET | Refills: 0 | Status: SHIPPED | OUTPATIENT
Start: 2018-12-28 | End: 2018-12-31

## 2018-12-28 RX ADMIN — NAPROXEN 500 MG: 250 TABLET ORAL at 16:48

## 2018-12-28 ASSESSMENT — ENCOUNTER SYMPTOMS
VOMITING: 0
COUGH: 0
RHINORRHEA: 0
SHORTNESS OF BREATH: 0
WHEEZING: 0
NAUSEA: 0
ABDOMINAL PAIN: 0
DIARRHEA: 0

## 2018-12-28 ASSESSMENT — PAIN DESCRIPTION - PAIN TYPE: TYPE: ACUTE PAIN

## 2018-12-28 ASSESSMENT — PAIN SCALES - GENERAL
PAINLEVEL_OUTOF10: 10
PAINLEVEL_OUTOF10: 10

## 2018-12-28 ASSESSMENT — PAIN DESCRIPTION - LOCATION: LOCATION: HEAD;SHOULDER;NECK

## 2018-12-28 ASSESSMENT — PAIN DESCRIPTION - ORIENTATION: ORIENTATION: LEFT

## 2019-02-13 ENCOUNTER — TELEPHONE (OUTPATIENT)
Dept: PRIMARY CARE CLINIC | Age: 74
End: 2019-02-13

## 2019-02-14 ENCOUNTER — OFFICE VISIT (OUTPATIENT)
Dept: PRIMARY CARE CLINIC | Age: 74
End: 2019-02-14
Payer: MEDICARE

## 2019-02-14 VITALS
HEIGHT: 65 IN | TEMPERATURE: 97.5 F | OXYGEN SATURATION: 100 % | HEART RATE: 67 BPM | DIASTOLIC BLOOD PRESSURE: 80 MMHG | BODY MASS INDEX: 28.32 KG/M2 | WEIGHT: 170 LBS | SYSTOLIC BLOOD PRESSURE: 130 MMHG

## 2019-02-14 DIAGNOSIS — Z12.31 ENCOUNTER FOR SCREENING MAMMOGRAM FOR HIGH-RISK PATIENT: ICD-10-CM

## 2019-02-14 DIAGNOSIS — G47.09 OTHER INSOMNIA: ICD-10-CM

## 2019-02-14 DIAGNOSIS — R07.82 INTERCOSTAL PAIN: ICD-10-CM

## 2019-02-14 DIAGNOSIS — I10 ESSENTIAL HYPERTENSION: ICD-10-CM

## 2019-02-14 PROCEDURE — 99214 OFFICE O/P EST MOD 30 MIN: CPT | Performed by: INTERNAL MEDICINE

## 2019-02-14 RX ORDER — TIZANIDINE 2 MG/1
2 TABLET ORAL NIGHTLY PRN
Qty: 10 TABLET | Refills: 0 | Status: SHIPPED | OUTPATIENT
Start: 2019-02-14 | End: 2019-03-05

## 2019-02-14 RX ORDER — MELOXICAM 15 MG/1
15 TABLET ORAL DAILY
Qty: 30 TABLET | Refills: 0 | Status: SHIPPED | OUTPATIENT
Start: 2019-02-14 | End: 2019-03-05

## 2019-02-14 RX ORDER — HYDROCHLOROTHIAZIDE 25 MG/1
25 TABLET ORAL DAILY
Qty: 90 TABLET | Refills: 5 | Status: SHIPPED | OUTPATIENT
Start: 2019-02-14 | End: 2019-03-05 | Stop reason: SDUPTHER

## 2019-02-14 ASSESSMENT — PATIENT HEALTH QUESTIONNAIRE - PHQ9
SUM OF ALL RESPONSES TO PHQ9 QUESTIONS 1 & 2: 0
SUM OF ALL RESPONSES TO PHQ QUESTIONS 1-9: 0
2. FEELING DOWN, DEPRESSED OR HOPELESS: 0
SUM OF ALL RESPONSES TO PHQ QUESTIONS 1-9: 0
1. LITTLE INTEREST OR PLEASURE IN DOING THINGS: 0

## 2019-02-14 ASSESSMENT — ENCOUNTER SYMPTOMS
CHEST TIGHTNESS: 0
ABDOMINAL DISTENTION: 0
CONSTIPATION: 0
WHEEZING: 0
DIARRHEA: 0
NAUSEA: 0
GASTROINTESTINAL NEGATIVE: 1
BACK PAIN: 1
ABDOMINAL PAIN: 0
EYES NEGATIVE: 1
SHORTNESS OF BREATH: 0

## 2019-03-05 ENCOUNTER — OFFICE VISIT (OUTPATIENT)
Dept: PRIMARY CARE CLINIC | Age: 74
End: 2019-03-05
Payer: MEDICARE

## 2019-03-05 VITALS
DIASTOLIC BLOOD PRESSURE: 90 MMHG | WEIGHT: 170 LBS | BODY MASS INDEX: 28.32 KG/M2 | HEART RATE: 64 BPM | HEIGHT: 65 IN | SYSTOLIC BLOOD PRESSURE: 140 MMHG

## 2019-03-05 DIAGNOSIS — E55.9 VITAMIN D DEFICIENCY: ICD-10-CM

## 2019-03-05 DIAGNOSIS — Z12.11 SCREEN FOR COLON CANCER: ICD-10-CM

## 2019-03-05 DIAGNOSIS — G47.00 INSOMNIA, UNSPECIFIED TYPE: ICD-10-CM

## 2019-03-05 DIAGNOSIS — I10 ESSENTIAL HYPERTENSION: ICD-10-CM

## 2019-03-05 DIAGNOSIS — M81.0 AGE-RELATED OSTEOPOROSIS WITHOUT CURRENT PATHOLOGICAL FRACTURE: ICD-10-CM

## 2019-03-05 DIAGNOSIS — Z12.31 ENCOUNTER FOR SCREENING MAMMOGRAM FOR HIGH-RISK PATIENT: ICD-10-CM

## 2019-03-05 DIAGNOSIS — Z12.4 SCREENING FOR CERVICAL CANCER: ICD-10-CM

## 2019-03-05 DIAGNOSIS — Z00.00 PHYSICAL EXAM: Primary | ICD-10-CM

## 2019-03-05 DIAGNOSIS — Z00.00 PHYSICAL EXAM: ICD-10-CM

## 2019-03-05 DIAGNOSIS — R20.2 TINGLING OF LEFT UPPER EXTREMITY: ICD-10-CM

## 2019-03-05 LAB
A/G RATIO: 1.7 (ref 1.1–2.2)
ALBUMIN SERPL-MCNC: 4.3 G/DL (ref 3.4–5)
ALP BLD-CCNC: 91 U/L (ref 40–129)
ALT SERPL-CCNC: 12 U/L (ref 10–40)
ANION GAP SERPL CALCULATED.3IONS-SCNC: 16 MMOL/L (ref 3–16)
AST SERPL-CCNC: 16 U/L (ref 15–37)
BASOPHILS ABSOLUTE: 0.1 K/UL (ref 0–0.2)
BASOPHILS RELATIVE PERCENT: 1.7 %
BILIRUB SERPL-MCNC: <0.2 MG/DL (ref 0–1)
BILIRUBIN URINE: NEGATIVE
BLOOD, URINE: NEGATIVE
BUN BLDV-MCNC: 19 MG/DL (ref 7–20)
CALCIUM SERPL-MCNC: 9.9 MG/DL (ref 8.3–10.6)
CHLORIDE BLD-SCNC: 101 MMOL/L (ref 99–110)
CHOLESTEROL, TOTAL: 201 MG/DL (ref 0–199)
CLARITY: CLEAR
CO2: 25 MMOL/L (ref 21–32)
COLOR: YELLOW
CREAT SERPL-MCNC: 0.8 MG/DL (ref 0.6–1.2)
EOSINOPHILS ABSOLUTE: 0.1 K/UL (ref 0–0.6)
EOSINOPHILS RELATIVE PERCENT: 1.9 %
EPITHELIAL CELLS, UA: 1 /HPF (ref 0–5)
GFR AFRICAN AMERICAN: >60
GFR NON-AFRICAN AMERICAN: >60
GLOBULIN: 2.6 G/DL
GLUCOSE BLD-MCNC: 92 MG/DL (ref 70–99)
GLUCOSE URINE: NEGATIVE MG/DL
HCT VFR BLD CALC: 38.6 % (ref 36–48)
HDLC SERPL-MCNC: 69 MG/DL (ref 40–60)
HEMOGLOBIN: 12.8 G/DL (ref 12–16)
HYALINE CASTS: 1 /LPF (ref 0–8)
KETONES, URINE: NEGATIVE MG/DL
LDL CHOLESTEROL CALCULATED: 117 MG/DL
LEUKOCYTE ESTERASE, URINE: ABNORMAL
LYMPHOCYTES ABSOLUTE: 2.3 K/UL (ref 1–5.1)
LYMPHOCYTES RELATIVE PERCENT: 42.3 %
MCH RBC QN AUTO: 28.9 PG (ref 26–34)
MCHC RBC AUTO-ENTMCNC: 33.2 G/DL (ref 31–36)
MCV RBC AUTO: 87.2 FL (ref 80–100)
MICROSCOPIC EXAMINATION: YES
MONOCYTES ABSOLUTE: 0.3 K/UL (ref 0–1.3)
MONOCYTES RELATIVE PERCENT: 6.1 %
NEUTROPHILS ABSOLUTE: 2.6 K/UL (ref 1.7–7.7)
NEUTROPHILS RELATIVE PERCENT: 48 %
NITRITE, URINE: NEGATIVE
PDW BLD-RTO: 14.4 % (ref 12.4–15.4)
PH UA: 7 (ref 5–8)
PLATELET # BLD: 217 K/UL (ref 135–450)
PMV BLD AUTO: 9.2 FL (ref 5–10.5)
POTASSIUM SERPL-SCNC: 4.8 MMOL/L (ref 3.5–5.1)
PROTEIN UA: NEGATIVE MG/DL
RBC # BLD: 4.42 M/UL (ref 4–5.2)
RBC UA: 2 /HPF (ref 0–4)
SODIUM BLD-SCNC: 142 MMOL/L (ref 136–145)
SPECIFIC GRAVITY UA: 1.02 (ref 1–1.03)
TOTAL PROTEIN: 6.9 G/DL (ref 6.4–8.2)
TRIGL SERPL-MCNC: 76 MG/DL (ref 0–150)
TSH SERPL DL<=0.05 MIU/L-ACNC: 2.66 UIU/ML (ref 0.27–4.2)
URINE TYPE: ABNORMAL
UROBILINOGEN, URINE: 1 E.U./DL
VITAMIN D 25-HYDROXY: 17.2 NG/ML
VLDLC SERPL CALC-MCNC: 15 MG/DL
WBC # BLD: 5.5 K/UL (ref 4–11)
WBC UA: 2 /HPF (ref 0–5)

## 2019-03-05 PROCEDURE — 99214 OFFICE O/P EST MOD 30 MIN: CPT | Performed by: INTERNAL MEDICINE

## 2019-03-05 RX ORDER — TRAZODONE HYDROCHLORIDE 50 MG/1
50 TABLET ORAL NIGHTLY
Qty: 30 TABLET | Refills: 1 | Status: SHIPPED | OUTPATIENT
Start: 2019-03-05 | End: 2021-04-09 | Stop reason: SDUPTHER

## 2019-03-05 RX ORDER — HYDROCHLOROTHIAZIDE 25 MG/1
25 TABLET ORAL DAILY
Qty: 90 TABLET | Refills: 5 | Status: SHIPPED | OUTPATIENT
Start: 2019-03-05 | End: 2019-04-10 | Stop reason: SDUPTHER

## 2019-03-05 ASSESSMENT — ENCOUNTER SYMPTOMS
SHORTNESS OF BREATH: 0
WHEEZING: 0
GASTROINTESTINAL NEGATIVE: 1
ABDOMINAL PAIN: 0
CHEST TIGHTNESS: 0
ABDOMINAL DISTENTION: 0
DIARRHEA: 0
EYES NEGATIVE: 1
CONSTIPATION: 0
NAUSEA: 0
BACK PAIN: 1

## 2019-03-06 ENCOUNTER — TELEPHONE (OUTPATIENT)
Dept: PRIMARY CARE CLINIC | Age: 74
End: 2019-03-06

## 2019-03-06 LAB
ESTIMATED AVERAGE GLUCOSE: 119.8 MG/DL
HBA1C MFR BLD: 5.8 %

## 2019-03-21 DIAGNOSIS — E55.9 VITAMIN D DEFICIENCY: ICD-10-CM

## 2019-03-21 RX ORDER — ERGOCALCIFEROL 1.25 MG/1
CAPSULE ORAL
Qty: 12 CAPSULE | Refills: 1 | Status: SHIPPED | OUTPATIENT
Start: 2019-03-21 | End: 2019-10-24

## 2019-03-26 RX ORDER — ERGOCALCIFEROL 1.25 MG/1
CAPSULE ORAL
Qty: 4 CAPSULE | Refills: 0 | Status: SHIPPED | OUTPATIENT
Start: 2019-03-26 | End: 2019-10-24

## 2019-04-10 ENCOUNTER — OFFICE VISIT (OUTPATIENT)
Dept: PRIMARY CARE CLINIC | Age: 74
End: 2019-04-10
Payer: MEDICARE

## 2019-04-10 VITALS
HEART RATE: 67 BPM | HEIGHT: 65 IN | WEIGHT: 173 LBS | SYSTOLIC BLOOD PRESSURE: 120 MMHG | DIASTOLIC BLOOD PRESSURE: 88 MMHG | BODY MASS INDEX: 28.82 KG/M2

## 2019-04-10 DIAGNOSIS — L24.5 IRRITANT CONTACT DERMATITIS DUE TO OTHER CHEMICAL PRODUCTS: ICD-10-CM

## 2019-04-10 DIAGNOSIS — R21 RASH AND NONSPECIFIC SKIN ERUPTION: ICD-10-CM

## 2019-04-10 DIAGNOSIS — R20.2 TINGLING OF LEFT UPPER EXTREMITY: ICD-10-CM

## 2019-04-10 DIAGNOSIS — I10 ESSENTIAL HYPERTENSION: ICD-10-CM

## 2019-04-10 DIAGNOSIS — E55.9 VITAMIN D DEFICIENCY: ICD-10-CM

## 2019-04-10 DIAGNOSIS — R21 RASH OF HANDS: ICD-10-CM

## 2019-04-10 DIAGNOSIS — M17.11 PRIMARY OSTEOARTHRITIS OF RIGHT KNEE: ICD-10-CM

## 2019-04-10 PROCEDURE — 99214 OFFICE O/P EST MOD 30 MIN: CPT | Performed by: INTERNAL MEDICINE

## 2019-04-10 RX ORDER — METHYLPREDNISOLONE 4 MG/1
TABLET ORAL
Qty: 21 TABLET | Refills: 0 | Status: SHIPPED | OUTPATIENT
Start: 2019-04-10 | End: 2019-04-16

## 2019-04-10 RX ORDER — DIAPER,BRIEF,INFANT-TODD,DISP
EACH MISCELLANEOUS
Qty: 1 TUBE | Refills: 3 | Status: SHIPPED | OUTPATIENT
Start: 2019-04-10 | End: 2020-08-17 | Stop reason: SDUPTHER

## 2019-04-10 RX ORDER — HYDROCHLOROTHIAZIDE 25 MG/1
25 TABLET ORAL DAILY
Qty: 90 TABLET | Refills: 5 | Status: SHIPPED | OUTPATIENT
Start: 2019-04-10 | End: 2020-10-14 | Stop reason: SDUPTHER

## 2019-04-10 ASSESSMENT — ENCOUNTER SYMPTOMS
ABDOMINAL PAIN: 0
SHORTNESS OF BREATH: 0
EYES NEGATIVE: 1
NAUSEA: 0
DIARRHEA: 0
CHEST TIGHTNESS: 0
GASTROINTESTINAL NEGATIVE: 1
ABDOMINAL DISTENTION: 0
CONSTIPATION: 0
BACK PAIN: 1
WHEEZING: 0

## 2019-04-10 NOTE — PROGRESS NOTES
Subjective:      Patient ID: Chad Navarro is a 75 y.o. female. 4/10/19 Patient presents with:  Leg Pain: whole rt leg hurts  Hypertension: no issues  Tingling: never went forr eMG  Rash        Seen in ER [facility] 4/6/19  For same        Last seen  3/5/19 Patient presents with:  Check-Up: tingling left arm continues . No numbness . no pain                 Works regularly ; [employer] keeping / cleaning           Leg Pain    Associated symptoms include numbness (and tingling left arm). \"          Review of Systems   Constitutional: Negative for activity change, appetite change, fatigue, fever and unexpected weight change. Pneumonia Vac 2/16 ; 12/16     Flu vac  10/18    Tdap 12/17    Zostavac/ script    HENT:        Top dentures    Eyes: Negative. Negative for visual disturbance. Wears glasses ; Eye exam 1/19   Respiratory: Negative for chest tightness, shortness of breath and wheezing. Does not smoke ( stopped 1998 ) . No Etoh      Cardiovascular:        HTN 1 yr     FH of HTN ; Son had MI    Gastrointestinal: Negative. Negative for abdominal distention, abdominal pain, constipation, diarrhea and nausea. [facility]oscopy 2013 at [facility]   No FH of Ca colon    Endocrine:        FH of diabetes    Genitourinary: Negative for dysuria, frequency, menstrual problem, urgency and vaginal discharge. Natural Menopause   Last pelvic/ Pap 2013    Mammogram  3/19   Musculoskeletal: Positive for arthralgias (rt leg) and back pain. DEXA 4/15 . Nl    Skin: Positive for rash. Allergic/Immunologic: Negative for food allergies. Neurological: Positive for numbness (and tingling left arm). Negative for dizziness, weakness and headaches. Psychiatric/Behavioral: Positive for sleep disturbance. Negative for behavioral problems. Objective:   Physical Exam   Constitutional: She is oriented to person, place, and time. Eyes: Conjunctivae are normal.   Neck: Neck supple.    Cardiovascular:

## 2019-04-11 ENCOUNTER — TELEPHONE (OUTPATIENT)
Dept: PAIN MANAGEMENT | Age: 74
End: 2019-04-11

## 2019-04-11 RX ORDER — BETAMETHASONE DIPROPIONATE 0.05 %
OINTMENT (GRAM) TOPICAL
Qty: 45 G | Refills: 0 | Status: SHIPPED | OUTPATIENT
Start: 2019-04-11 | End: 2019-09-30 | Stop reason: SDUPTHER

## 2019-04-11 RX ORDER — CLOBETASOL PROPIONATE 0.5 MG/G
OINTMENT TOPICAL
Qty: 30 G | Refills: 3 | Status: SHIPPED | OUTPATIENT
Start: 2019-04-11 | End: 2020-09-04 | Stop reason: DRUGHIGH

## 2019-04-16 ENCOUNTER — OFFICE VISIT (OUTPATIENT)
Dept: ORTHOPEDIC SURGERY | Age: 74
End: 2019-04-16
Payer: MEDICARE

## 2019-04-16 VITALS
DIASTOLIC BLOOD PRESSURE: 81 MMHG | WEIGHT: 173 LBS | SYSTOLIC BLOOD PRESSURE: 120 MMHG | HEIGHT: 65 IN | HEART RATE: 72 BPM | BODY MASS INDEX: 28.82 KG/M2

## 2019-04-16 DIAGNOSIS — M17.11 PRIMARY OSTEOARTHRITIS OF RIGHT KNEE: Primary | ICD-10-CM

## 2019-04-16 PROCEDURE — 99214 OFFICE O/P EST MOD 30 MIN: CPT | Performed by: ORTHOPAEDIC SURGERY

## 2019-04-16 PROCEDURE — 20610 DRAIN/INJ JOINT/BURSA W/O US: CPT | Performed by: ORTHOPAEDIC SURGERY

## 2019-04-16 NOTE — PROGRESS NOTES
CHIEF COMPLAINT: Right knee pain/osteoarthritis. HISTORY:  Ms. Rigo Zuniga 75 y.o.  female presents today for the first visit for evaluation of right knee pain which started to worsen 2 weeks ago. She has been seeing Elissa Calixto and Dr. Kathya Shea for bilateral knee pain for many years getting cortisone injections with relief. Her most recent cortisone injection in her right knee was 9/5/2018 with good relief until 2 weeks ago. She states that she works and lives area near to the [place] office and would like to stay at this location. She is complaining of achy pain. Pain is increase with standing and walking and decrease with rest. Pain is sharp early in the morning with first few steps, dull achy pain by the end of the day. Alleviating factors: rest. No radiation and no numbness and tingling sensation. No other complaint. No h/o trauma or gout. She is well-known to me for ankle arthritis in 2013 from a history of multiple ankle sprains. Past Medical History:   Diagnosis Date    Arthritis     Hypertension        Past Surgical History:   Procedure Laterality Date    ANKLE FRACTURE SURGERY  2009    ROTATOR CUFF REPAIR Right 8/2/13       Social History     Socioeconomic History    Marital status:       Spouse name: Not on file    Number of children: 3    Years of education: Not on file    Highest education level: Not on file   Occupational History    Not on file   Social Needs    Financial resource strain: Not on file    Food insecurity:     Worry: Not on file     Inability: Not on file    Transportation needs:     Medical: Not on file     Non-medical: Not on file   Tobacco Use    Smoking status: Former Smoker    Smokeless tobacco: Never Used   Substance and Sexual Activity    Alcohol use: No    Drug use: No    Sexual activity: Never   Lifestyle    Physical activity:     Days per week: Not on file     Minutes per session: Not on file    Stress: Not on file   Relationships  Social connections:     Talks on phone: Not on file     Gets together: Not on file     Attends Gnosticist service: Not on file     Active member of club or organization: Not on file     Attends meetings of clubs or organizations: Not on file     Relationship status: Not on file    Intimate partner violence:     Fear of current or ex partner: Not on file     Emotionally abused: Not on file     Physically abused: Not on file     Forced sexual activity: Not on file   Other Topics Concern    Not on file   Social History Narrative    Not on file       Family History   Problem Relation Age of Onset    Cancer Mother         lung    Stroke Father     Cancer Maternal Grandmother     Stroke Maternal Grandfather        Current Outpatient Medications on File Prior to Visit   Medication Sig Dispense Refill    betamethasone dipropionate (DIPROLENE) 0.05 % ointment APPLY TOPICALLY TWICE DAILY FOR 3-4 WEEKS 45 g 0    clobetasol (TEMOVATE) 0.05 % ointment APPLY EXTERNALLY TO THE AFFECTED AREA TWICE DAILY 30 g 3    methylPREDNISolone (MEDROL DOSEPACK) 4 MG tablet Take by mouth. 21 tablet 0    diclofenac sodium 1 % GEL Apply 4 g topically 4 times daily 2 Tube 3    hydrochlorothiazide (HYDRODIURIL) 25 MG tablet Take 1 tablet by mouth daily 90 tablet 5    hydrocortisone (V-R HYDROCORTISONE/ALOE) 0.5 % ointment Apply topically 2 times daily. 1 Tube 3    vitamin D (ERGOCALCIFEROL) 09455 units CAPS capsule TAKE 1 CAPSULE BY MOUTH 1 TIME A WEEK 4 capsule 0    vitamin D (ERGOCALCIFEROL) 04120 units CAPS capsule TAKE 1 CAPSULE BY MOUTH 1 TIME A WEEK 12 capsule 1    traZODone (DESYREL) 50 MG tablet Take 1 tablet by mouth nightly 30 tablet 1     No current facility-administered medications on file prior to visit. Pertinent items are noted in HPI  Review of systems reviewed from Patient History Form dated on 9/5/2018 and available in the patient's chart under the Media tab. No change noted.        PHYSICAL EXAMINATION:  Ms. Thiago Prieto is a very pleasant 75 y.o.  female who presents today in no acute distress, awake, alert, and oriented. She is well dressed, nourished and  groomed. Patient with normal affect. Height is  5' 5\" (1.651 m), weight is 173 lb (78.5 kg), Body mass index is 28.79 kg/m². Resting respiratory rate is 16. Examination of the gait, showed that the patient walks heel-toe with a non-antalgic gait and no limp.  Examination of both knees showing full ROM, right mild crepitus, tenderness on medial joint line, stable to varus and valgus stress. She has intact sensation and good pedal pulses. She has good strength in 2 planes, and has mild tenderness on deep palpation over the medial joint line. Knee reflex 1+ bilaterally. IMAGING:  Xray 3 views of the bilaterally knee was obtained 9/5/2018 in the office and reviewed. These demonstrate moderate degenerative changes with narrowing of the joint space in the medial joint space compartment, subchondral sclerosis, and marginal osteophytosis. There is calcification of the lateral meniscus. IMPRESSION: Right knee DJD. PLAN: I discussed with the patient the treatment options including both surgical and non-surgical treatment. We recommended Quad exercises and stretching of the calf and hamstrings which was taught to the patient today. She will take NSAIDS as needed. I believe she will benefit from cortisone injection right knee, and she agreed to have. PROCEDURE:    With the patient's permission, and under sterile condition, the right knee was prepared and draped with alcohol and injected with a mixture of 1 mL Kenalog 40mg and 4 mL of 1% lidocaine through the medial parapatellar port area. The patient tolerated the procedure well. A band-aid was applied and the patient was advised to ice the knee for 15-20 minutes to relieve any injection site related pain.     She reported a good improvement immediatly after the injection. F/u in 3-4 months as needed, PT if needed. She understands that this may need surgery if the pain did not to resolve.      Ermelinda Ramirez MD

## 2019-04-17 PROBLEM — M17.11 PRIMARY OSTEOARTHRITIS OF RIGHT KNEE: Status: ACTIVE | Noted: 2019-04-17

## 2019-05-07 ENCOUNTER — HOSPITAL ENCOUNTER (EMERGENCY)
Age: 74
Discharge: HOME | End: 2019-05-08
Attending: EMERGENCY MEDICINE
Payer: MEDICARE

## 2019-05-07 VITALS
HEIGHT: 65 IN | TEMPERATURE: 97.3 F | WEIGHT: 170 LBS | SYSTOLIC BLOOD PRESSURE: 158 MMHG | RESPIRATION RATE: 16 BRPM | HEART RATE: 65 BPM | BODY MASS INDEX: 28.32 KG/M2 | OXYGEN SATURATION: 96 % | DIASTOLIC BLOOD PRESSURE: 93 MMHG

## 2019-05-07 DIAGNOSIS — M25.571 ACUTE RIGHT ANKLE PAIN: Primary | ICD-10-CM

## 2019-05-07 PROCEDURE — 99283 EMERGENCY DEPT VISIT LOW MDM: CPT

## 2019-05-07 ASSESSMENT — PAIN SCALES - GENERAL: PAINLEVEL_OUTOF10: 9

## 2019-05-08 ENCOUNTER — APPOINTMENT (OUTPATIENT)
Dept: GENERAL RADIOLOGY | Age: 74
End: 2019-05-08
Payer: MEDICARE

## 2019-05-08 PROCEDURE — 73610 X-RAY EXAM OF ANKLE: CPT

## 2019-05-08 RX ORDER — TRAMADOL HYDROCHLORIDE 50 MG/1
50 TABLET ORAL EVERY 6 HOURS PRN
Qty: 15 TABLET | Refills: 0 | Status: SHIPPED | OUTPATIENT
Start: 2019-05-08 | End: 2019-05-11

## 2019-05-08 NOTE — ED PROVIDER NOTES
History:   Diagnosis Date    Arthritis     Hypertension          SURGICAL HISTORY       Past Surgical History:   Procedure Laterality Date    ANKLE FRACTURE SURGERY  2009    ROTATOR CUFF REPAIR Right 8/2/13         CURRENT MEDICATIONS       Previous Medications    BETAMETHASONE DIPROPIONATE (DIPROLENE) 0.05 % OINTMENT    APPLY TOPICALLY TWICE DAILY FOR 3-4 WEEKS    CLOBETASOL (TEMOVATE) 0.05 % OINTMENT    APPLY EXTERNALLY TO THE AFFECTED AREA TWICE DAILY    DICLOFENAC SODIUM 1 % GEL    Apply 4 g topically 4 times daily    HYDROCHLOROTHIAZIDE (HYDRODIURIL) 25 MG TABLET    Take 1 tablet by mouth daily    HYDROCORTISONE (V-R HYDROCORTISONE/ALOE) 0.5 % OINTMENT    Apply topically 2 times daily. TRAZODONE (DESYREL) 50 MG TABLET    Take 1 tablet by mouth nightly    VITAMIN D (ERGOCALCIFEROL) 10320 UNITS CAPS CAPSULE    TAKE 1 CAPSULE BY MOUTH 1 TIME A WEEK    VITAMIN D (ERGOCALCIFEROL) 91501 UNITS CAPS CAPSULE    TAKE 1 CAPSULE BY MOUTH 1 TIME A WEEK       ALLERGIES     Patient has no known allergies. FAMILY HISTORY       Family History   Problem Relation Age of Onset    Cancer Mother         lung    Stroke Father     Cancer Maternal Grandmother     Stroke Maternal Grandfather           SOCIAL HISTORY       Social History     Socioeconomic History    Marital status:       Spouse name: None    Number of children: 4    Years of education: None    Highest education level: None   Occupational History    None   Social Needs    Financial resource strain: None    Food insecurity:     Worry: None     Inability: None    Transportation needs:     Medical: None     Non-medical: None   Tobacco Use    Smoking status: Former Smoker    Smokeless tobacco: Never Used   Substance and Sexual Activity    Alcohol use: No    Drug use: No    Sexual activity: Never   Lifestyle    Physical activity:     Days per week: None     Minutes per session: None    Stress: None   Relationships    Social connections: Talks on phone: None     Gets together: None     Attends Taoist service: None     Active member of club or organization: None     Attends meetings of clubs or organizations: None     Relationship status: None    Intimate partner violence:     Fear of current or ex partner: None     Emotionally abused: None     Physically abused: None     Forced sexual activity: None   Other Topics Concern    None   Social History Narrative    None       SCREENINGS             PHYSICAL EXAM    (up to 7 for level 4, 8 or more for level 5)     ED Triage Vitals [05/07/19 2305]   BP Temp Temp src Pulse Resp SpO2 Height Weight   (!) 158/93 97.3 °F (36.3 °C) -- 65 16 96 % 5' 5\" (1.651 m) 170 lb (77.1 kg)       Physical Exam  GENERAL: Patient appeared well-developed, well-nourished, vital signs reviewed. MENTAL STATUS: Patient is awake and alert   HEAD AND FACE :Head is normal cephalic. Face normal appearance  EYES: eyes lids and conjunctiva appear normal. Pupils reactive  ENT :ears and nose appear normal externally. CV: Heart regular rate and rhythm without murmur  LUNGS: Lungs are clear to auscultation  CHEST WALL: normal appearance. normal motion  EXTREMITIES: Extremities moves all 4 Extremities without any weakness. The right ankle is tender over the lateral malleolus where there is a screw palpable underneath the skin. There is no ankle joint effusion no tenderness with motion of the ankle circulation sensation intact the extremity  There is no calf tenderness or lower extremity edema  SKIN: Skin shows no  rashes or ulcerations  PSYCHIATRIC:mood and affect normal    NEUROLOGIC: no weakness or numbness noted,  no meningeal signs      This is a computerized dictation.  I have made every effort to proofread this chart, however, transcription errors may exist.     DIAGNOSTIC RESULTS         Interpretation per the Radiologist below, if available at the time of this note:    XR ANKLE RIGHT (MIN 3 VIEWS)    (Results Pending) LABS:  Labs Reviewed - No data to display    All other labs were within normal range or not returned as of this dictation. EMERGENCY DEPARTMENT COURSE and DIFFERENTIAL DIAGNOSIS/MDM:   Vitals:    Vitals:    05/07/19 2305   BP: (!) 158/93   Pulse: 65   Resp: 16   Temp: 97.3 °F (36.3 °C)   SpO2: 96%   Weight: 170 lb (77.1 kg)   Height: 5' 5\" (1.651 m)           MDM    Patient has pain at the lateral malleolus site where a screw triggers close the skin and also previous plate. I see no ankle joint abnormality. We'll refer her to orthopedic surgery reevaluation of this possible removal of the screw. Face on Ultram in the meantime to use mainly at nighttime    REASSESSMENT        CONSULTS:  None    Procedures:  Unless otherwise noted below, none     Procedures    FINAL IMPRESSION    Ankle pain    No diagnosis found. DISPOSITION/PLAN   DISPOSITION        PATIENT REFERRED TO:  No follow-up provider specified.     DISCHARGE MEDICATIONS:  New Prescriptions    No medications on file          (Please note that portions of this note were completed with a voice recognition program.  Efforts were made to edit thedictations but occasionally words are mis-transcribed.)    Savanna Lund DO (electronically signed)Emergency Physician       Savanna Lund DO  05/08/19 [id number]

## 2019-05-21 ENCOUNTER — OFFICE VISIT (OUTPATIENT)
Dept: ORTHOPEDIC SURGERY | Age: 74
End: 2019-05-21
Payer: MEDICARE

## 2019-05-21 VITALS
BODY MASS INDEX: 28.32 KG/M2 | RESPIRATION RATE: 16 BRPM | HEIGHT: 65 IN | WEIGHT: 170 LBS | SYSTOLIC BLOOD PRESSURE: 132 MMHG | HEART RATE: 74 BPM | DIASTOLIC BLOOD PRESSURE: 78 MMHG

## 2019-05-21 DIAGNOSIS — M54.31 RIGHT SIDED SCIATICA: Primary | ICD-10-CM

## 2019-05-21 PROCEDURE — 99203 OFFICE O/P NEW LOW 30 MIN: CPT | Performed by: ORTHOPAEDIC SURGERY

## 2019-05-22 RX ORDER — METHYLPREDNISOLONE 4 MG/1
TABLET ORAL
Qty: 1 KIT | Refills: 0 | Status: SHIPPED | OUTPATIENT
Start: 2019-05-22 | End: 2019-10-24

## 2019-05-29 NOTE — PROGRESS NOTES
CHIEF COMPLAINT: Right ankle pain/  Sciatica. HISTORY:  Lydia Dunlap is a 75y.o. year old female who is seen today for evaluation of right ankle pain, but also reported posterior hip pain that radiate to the leg. she reports that this started months ago with no specific injury that started the pain. She reports that the pain is worse with activity and better with rest.  She reports that the pain is aching and dull in nature. She had ORIF right ankle 10 years ago. Past Medical History:   Diagnosis Date    Arthritis     Hypertension        Social History     Socioeconomic History    Marital status:       Spouse name: Not on file    Number of children: 3    Years of education: Not on file    Highest education level: Not on file   Occupational History    Not on file   Social Needs    Financial resource strain: Not on file    Food insecurity:     Worry: Not on file     Inability: Not on file    Transportation needs:     Medical: Not on file     Non-medical: Not on file   Tobacco Use    Smoking status: Former Smoker    Smokeless tobacco: Never Used   Substance and Sexual Activity    Alcohol use: No    Drug use: No    Sexual activity: Never   Lifestyle    Physical activity:     Days per week: Not on file     Minutes per session: Not on file    Stress: Not on file   Relationships    Social connections:     Talks on phone: Not on file     Gets together: Not on file     Attends Jain service: Not on file     Active member of club or organization: Not on file     Attends meetings of clubs or organizations: Not on file     Relationship status: Not on file    Intimate partner violence:     Fear of current or ex partner: Not on file     Emotionally abused: Not on file     Physically abused: Not on file     Forced sexual activity: Not on file   Other Topics Concern    Not on file   Social History Narrative    Not on file       Family History   Problem Relation Age of Onset    Cancer Mother         lung    Stroke Father     Cancer Maternal Grandmother     Stroke Maternal Grandfather        Pertinent items are noted in HPI  Review of systems reviewed from Patient History Form dated on 5/21/2019 and available in the patient's chart under the Media tab. No change noted. PHYSICAL EXAM:  Ms. Joe Cxo is a very pleasant 75 y.o.  female who presents today in no acute distress, awake, alert, and oriented. She is well dressed, nourished and  groomed. Patient with normal affect. Height is  5' 5\" (1.651 m), weight is 170 lb (77.1 kg). Resting respiratory rate is 16. Examination of the gait, showed that the patient walks heel-toe with a non-antalgic gait and no limp.  Examination of both knees and hips showing full ROM. She has intact sensation and good pedal pulses. Knee reflex 1+ bilaterally. She is nontender to palpation at the lateral aspect of either hip overlying the greater trochanteric bursa. Motor strength is 5/5 throughout both lower extremities. She has a positive straight leg raise at 80 degree. The skin overlying the right hip is intact without evidence of scar, lesion, laceration or abrasion. Distal pulses are 2+ and symmetric bilaterally. Sensation is grossly intact to light touch and symmetric over the bilateral lower extremities. IMAGING:  Three views of the left ankle taken 5/8/2019 were reviewed. These demonstrate that the ankle fracture well healed, lateral plate and screws in place within the talus, no hardware failure. IMPRESSION:  Right sciatica. PLAN: I have discussed the normal course and history of this condition with the patient, and various treatment options. We also discussed the use of NSAIDs and tylenol, as well as risks and benefits of SYL. The patient would like to try Meds, and we will give Medrol dose pack.     F/U with our spine team.      Neha Coffman MD

## 2019-09-05 ENCOUNTER — OFFICE VISIT (OUTPATIENT)
Dept: ORTHOPEDIC SURGERY | Age: 74
End: 2019-09-05
Payer: MEDICARE

## 2019-09-05 VITALS
DIASTOLIC BLOOD PRESSURE: 96 MMHG | HEART RATE: 58 BPM | RESPIRATION RATE: 16 BRPM | HEIGHT: 65 IN | WEIGHT: 170 LBS | SYSTOLIC BLOOD PRESSURE: 162 MMHG | BODY MASS INDEX: 28.32 KG/M2

## 2019-09-05 DIAGNOSIS — M17.11 PRIMARY OSTEOARTHRITIS OF RIGHT KNEE: Primary | ICD-10-CM

## 2019-09-05 PROCEDURE — 99213 OFFICE O/P EST LOW 20 MIN: CPT | Performed by: ORTHOPAEDIC SURGERY

## 2019-09-05 PROCEDURE — 20610 DRAIN/INJ JOINT/BURSA W/O US: CPT | Performed by: ORTHOPAEDIC SURGERY

## 2019-09-30 DIAGNOSIS — R21 RASH OF HANDS: ICD-10-CM

## 2019-10-01 RX ORDER — BETAMETHASONE DIPROPIONATE 0.05 %
OINTMENT (GRAM) TOPICAL
Qty: 45 G | Refills: 0 | Status: SHIPPED | OUTPATIENT
Start: 2019-10-01 | End: 2020-03-12 | Stop reason: ALTCHOICE

## 2019-10-24 ENCOUNTER — APPOINTMENT (OUTPATIENT)
Dept: GENERAL RADIOLOGY | Age: 74
End: 2019-10-24
Payer: MEDICARE

## 2019-10-24 ENCOUNTER — HOSPITAL ENCOUNTER (EMERGENCY)
Age: 74
Discharge: HOME | End: 2019-10-24
Payer: MEDICARE

## 2019-10-24 ENCOUNTER — TELEPHONE (OUTPATIENT)
Dept: ORTHOPEDIC SURGERY | Age: 74
End: 2019-10-24

## 2019-10-24 VITALS
SYSTOLIC BLOOD PRESSURE: 158 MMHG | RESPIRATION RATE: 18 BRPM | OXYGEN SATURATION: 97 % | TEMPERATURE: 97.5 F | BODY MASS INDEX: 28.32 KG/M2 | WEIGHT: 170 LBS | HEART RATE: 67 BPM | DIASTOLIC BLOOD PRESSURE: 88 MMHG | HEIGHT: 65 IN

## 2019-10-24 DIAGNOSIS — G89.29 CHRONIC PAIN OF RIGHT KNEE: Primary | ICD-10-CM

## 2019-10-24 DIAGNOSIS — M25.561 CHRONIC PAIN OF RIGHT KNEE: Primary | ICD-10-CM

## 2019-10-24 PROCEDURE — 73560 X-RAY EXAM OF KNEE 1 OR 2: CPT

## 2019-10-24 PROCEDURE — 6370000000 HC RX 637 (ALT 250 FOR IP): Performed by: NURSE PRACTITIONER

## 2019-10-24 PROCEDURE — 99283 EMERGENCY DEPT VISIT LOW MDM: CPT

## 2019-10-24 RX ORDER — IBUPROFEN 600 MG/1
600 TABLET ORAL EVERY 6 HOURS PRN
Qty: 30 TABLET | Refills: 0 | Status: SHIPPED | OUTPATIENT
Start: 2019-10-24 | End: 2020-01-30 | Stop reason: DRUGHIGH

## 2019-10-24 RX ORDER — LIDOCAINE 50 MG/G
1 PATCH TOPICAL DAILY
Qty: 30 PATCH | Refills: 0 | Status: SHIPPED | OUTPATIENT
Start: 2019-10-24 | End: 2020-03-12 | Stop reason: ALTCHOICE

## 2019-10-24 RX ORDER — LIDOCAINE 4 G/G
1 PATCH TOPICAL DAILY
Status: DISCONTINUED | OUTPATIENT
Start: 2019-10-24 | End: 2019-10-24 | Stop reason: HOSPADM

## 2019-10-24 RX ORDER — TRAMADOL HYDROCHLORIDE 50 MG/1
50 TABLET ORAL EVERY 6 HOURS PRN
Qty: 20 TABLET | Refills: 0 | Status: SHIPPED | OUTPATIENT
Start: 2019-10-24 | End: 2019-10-27

## 2019-10-24 ASSESSMENT — ENCOUNTER SYMPTOMS
SHORTNESS OF BREATH: 0
CHEST TIGHTNESS: 0
ABDOMINAL PAIN: 0
NAUSEA: 0
VOMITING: 0
DIARRHEA: 0

## 2019-10-24 ASSESSMENT — PAIN DESCRIPTION - LOCATION: LOCATION: KNEE

## 2019-10-24 ASSESSMENT — PAIN DESCRIPTION - ORIENTATION: ORIENTATION: RIGHT

## 2019-10-24 ASSESSMENT — PAIN SCALES - GENERAL: PAINLEVEL_OUTOF10: 9

## 2019-10-24 ASSESSMENT — PAIN DESCRIPTION - PAIN TYPE: TYPE: ACUTE PAIN

## 2019-10-29 ENCOUNTER — OFFICE VISIT (OUTPATIENT)
Dept: ORTHOPEDIC SURGERY | Age: 74
End: 2019-10-29
Payer: MEDICARE

## 2019-10-29 VITALS — HEIGHT: 65 IN | HEART RATE: 68 BPM | RESPIRATION RATE: 16 BRPM | BODY MASS INDEX: 28.32 KG/M2 | WEIGHT: 170 LBS

## 2019-10-29 DIAGNOSIS — M17.11 PRIMARY OSTEOARTHRITIS OF RIGHT KNEE: Primary | ICD-10-CM

## 2019-10-29 PROCEDURE — 99213 OFFICE O/P EST LOW 20 MIN: CPT | Performed by: ORTHOPAEDIC SURGERY

## 2019-10-29 RX ORDER — METHYLPREDNISOLONE 4 MG/1
TABLET ORAL
Qty: 1 KIT | Refills: 0 | Status: SHIPPED | OUTPATIENT
Start: 2019-10-29 | End: 2019-11-07 | Stop reason: ALTCHOICE

## 2019-11-04 ENCOUNTER — TELEPHONE (OUTPATIENT)
Dept: ORTHOPEDIC SURGERY | Age: 74
End: 2019-11-04

## 2019-11-07 ENCOUNTER — OFFICE VISIT (OUTPATIENT)
Dept: PRIMARY CARE CLINIC | Age: 74
End: 2019-11-07
Payer: MEDICARE

## 2019-11-07 VITALS
HEART RATE: 68 BPM | HEIGHT: 65 IN | SYSTOLIC BLOOD PRESSURE: 145 MMHG | DIASTOLIC BLOOD PRESSURE: 90 MMHG | BODY MASS INDEX: 28.16 KG/M2 | WEIGHT: 169 LBS

## 2019-11-07 DIAGNOSIS — Z00.00 ROUTINE GENERAL MEDICAL EXAMINATION AT A HEALTH CARE FACILITY: ICD-10-CM

## 2019-11-07 DIAGNOSIS — Z23 NEED FOR SHINGLES VACCINE: ICD-10-CM

## 2019-11-07 DIAGNOSIS — Z12.11 SCREEN FOR COLON CANCER: ICD-10-CM

## 2019-11-07 DIAGNOSIS — Z23 NEED FOR TDAP VACCINATION: Primary | ICD-10-CM

## 2019-11-07 PROCEDURE — G0438 PPPS, INITIAL VISIT: HCPCS | Performed by: INTERNAL MEDICINE

## 2019-11-07 PROCEDURE — 90715 TDAP VACCINE 7 YRS/> IM: CPT | Performed by: INTERNAL MEDICINE

## 2019-11-07 PROCEDURE — 90471 IMMUNIZATION ADMIN: CPT | Performed by: INTERNAL MEDICINE

## 2019-11-07 ASSESSMENT — ENCOUNTER SYMPTOMS
ABDOMINAL DISTENTION: 0
SHORTNESS OF BREATH: 0
GASTROINTESTINAL NEGATIVE: 1
EYES NEGATIVE: 1
CHEST TIGHTNESS: 0
NAUSEA: 0
ABDOMINAL PAIN: 0
WHEEZING: 0
CONSTIPATION: 0
DIARRHEA: 0

## 2019-11-07 ASSESSMENT — PATIENT HEALTH QUESTIONNAIRE - PHQ9
SUM OF ALL RESPONSES TO PHQ QUESTIONS 1-9: 0
SUM OF ALL RESPONSES TO PHQ QUESTIONS 1-9: 0

## 2019-11-19 ENCOUNTER — OFFICE VISIT (OUTPATIENT)
Dept: ORTHOPEDIC SURGERY | Age: 74
End: 2019-11-19
Payer: MEDICARE

## 2019-11-19 VITALS — BODY MASS INDEX: 28.16 KG/M2 | RESPIRATION RATE: 16 BRPM | HEART RATE: 82 BPM | WEIGHT: 169 LBS | HEIGHT: 65 IN

## 2019-11-19 DIAGNOSIS — M17.11 PRIMARY OSTEOARTHRITIS OF RIGHT KNEE: Primary | ICD-10-CM

## 2019-11-19 PROCEDURE — 20610 DRAIN/INJ JOINT/BURSA W/O US: CPT | Performed by: ORTHOPAEDIC SURGERY

## 2019-11-19 PROCEDURE — 99213 OFFICE O/P EST LOW 20 MIN: CPT | Performed by: ORTHOPAEDIC SURGERY

## 2019-11-26 ENCOUNTER — OFFICE VISIT (OUTPATIENT)
Dept: ORTHOPEDIC SURGERY | Age: 74
End: 2019-11-26
Payer: MEDICARE

## 2019-11-26 VITALS — RESPIRATION RATE: 16 BRPM | BODY MASS INDEX: 28.16 KG/M2 | HEIGHT: 65 IN | WEIGHT: 169 LBS

## 2019-11-26 DIAGNOSIS — M17.11 PRIMARY OSTEOARTHRITIS OF RIGHT KNEE: Primary | ICD-10-CM

## 2019-11-26 PROCEDURE — 20610 DRAIN/INJ JOINT/BURSA W/O US: CPT | Performed by: ORTHOPAEDIC SURGERY

## 2019-11-26 PROCEDURE — 99213 OFFICE O/P EST LOW 20 MIN: CPT | Performed by: ORTHOPAEDIC SURGERY

## 2019-12-10 ENCOUNTER — OFFICE VISIT (OUTPATIENT)
Dept: ORTHOPEDIC SURGERY | Age: 74
End: 2019-12-10
Payer: MEDICARE

## 2019-12-10 VITALS
HEIGHT: 65 IN | WEIGHT: 169 LBS | HEART RATE: 68 BPM | SYSTOLIC BLOOD PRESSURE: 154 MMHG | BODY MASS INDEX: 28.16 KG/M2 | DIASTOLIC BLOOD PRESSURE: 89 MMHG | RESPIRATION RATE: 16 BRPM

## 2019-12-10 DIAGNOSIS — M17.11 PRIMARY OSTEOARTHRITIS OF RIGHT KNEE: Primary | ICD-10-CM

## 2019-12-10 PROCEDURE — 20610 DRAIN/INJ JOINT/BURSA W/O US: CPT | Performed by: ORTHOPAEDIC SURGERY

## 2020-01-30 ENCOUNTER — OFFICE VISIT (OUTPATIENT)
Dept: ORTHOPEDIC SURGERY | Age: 75
End: 2020-01-30
Payer: MEDICARE

## 2020-01-30 VITALS
BODY MASS INDEX: 28.32 KG/M2 | HEIGHT: 65 IN | SYSTOLIC BLOOD PRESSURE: 129 MMHG | HEART RATE: 86 BPM | WEIGHT: 170 LBS | DIASTOLIC BLOOD PRESSURE: 84 MMHG

## 2020-01-30 PROCEDURE — 99213 OFFICE O/P EST LOW 20 MIN: CPT | Performed by: ORTHOPAEDIC SURGERY

## 2020-01-30 RX ORDER — IBUPROFEN 600 MG/1
600 TABLET ORAL EVERY 12 HOURS PRN
Qty: 60 TABLET | Refills: 2 | Status: SHIPPED | OUTPATIENT
Start: 2020-01-30 | End: 2020-09-14

## 2020-01-30 NOTE — PROGRESS NOTES
ORTHOPAEDIC NEW PATIENT NOTE    Chief Complaint   Patient presents with    Knee Pain     Right knee pain x1 yr.        Knee Pain        75 y.o. female seen for evaluation of right knee pain  Previously seen and treated by [facility]    Onset ~1 year  Injury/trauma none  Pain is located anterior and lateral knee  Worse with activity, WB   Better with rest, ibuprofen at night, icyhot    Previous treatments include steroid injection, and also series of 3 euflexxa, most recent was 12/10/2019  Reports 1 week relief only  No formal PT      Back pain = no  Radicular symptoms = no  Numbness and/or tingling = no    I have reviewed and discussed the below pain assessment findings with the patient. Pain Assessment  Location of Pain: Knee  Location Modifiers: Right  Severity of Pain: 4  Quality of Pain: Aching, Sharp  Duration of Pain: Persistent  Frequency of Pain: Intermittent  Date Pain First Started: (1 yr)  Aggravating Factors: Bending  Limiting Behavior: Yes  Relieving Factors:  Other (Comment)(Icy Hot, Walking)  Result of Injury: No  Work-Related Injury: No  Are there other pain locations you wish to document?: No    Review of Systems  Constitutional - denies fevers, weight loss  Cardiovascular - denies chest pain, palpitations, peripheral edema, blood clots; + HTN  Respiratory - denies SOB, cough  Gastrointestinal - denies abdominal pain, nausea, vomiting  Genitourinary - denies dysuria, discharge  Musculoskeletal - per HPI  Integumentary - denies rash, sores  Neurologic - denies numbness, tingling, paresthesias  Hematologic - denies abnormal bleeding, blood clots  Allergic/Immunologic - denies metal allergies, recurrent infections    No Known Allergies     Current Outpatient Medications   Medication Sig Dispense Refill    Menthol, Topical Analgesic, (ICY HOT EX) Apply topically      ibuprofen (ADVIL;MOTRIN) 600 MG tablet Take 1 tablet by mouth every 12 hours as needed 60 tablet 2    clobetasol (TEMOVATE) 0.05 % ointment APPLY EXTERNALLY TO THE AFFECTED AREA TWICE DAILY 30 g 3    hydrochlorothiazide (HYDRODIURIL) 25 MG tablet Take 1 tablet by mouth daily 90 tablet 5    hydrocortisone (V-R HYDROCORTISONE/ALOE) 0.5 % ointment Apply topically 2 times daily. 1 Tube 3    traZODone (DESYREL) 50 MG tablet Take 1 tablet by mouth nightly 30 tablet 1    lidocaine (LIDODERM) 5 % Place 1 patch onto the skin daily 12 hours on, 12 hours off. May substitute for 4% over-the-counter lidocaine patch if the 5% is not covered by insurance, same instructions please (Patient not taking: Reported on 1/30/2020) 30 patch 0    betamethasone dipropionate (DIPROLENE) 0.05 % ointment APPLY EXTERNALLY TO THE AFFECTED AREA TWICE DAILY FOR 3 TO 4 WEEKS (Patient not taking: Reported on 1/30/2020) 45 g 0     No current facility-administered medications for this visit. Past Medical History:   Diagnosis Date    Arthritis     Hypertension         Past Surgical History:   Procedure Laterality Date    ANKLE FRACTURE SURGERY  2009    ROTATOR CUFF REPAIR Right 8/2/13       Family History   Problem Relation Age of Onset    Cancer Mother         lung    Stroke Father     Cancer Maternal Grandmother     Stroke Maternal Grandfather        Social History     Socioeconomic History    Marital status:       Spouse name: Not on file    Number of children: 3    Years of education: Not on file    Highest education level: Not on file   Occupational History    Not on file   Social Needs    Financial resource strain: Not on file    Food insecurity:     Worry: Not on file     Inability: Not on file    Transportation needs:     Medical: Not on file     Non-medical: Not on file   Tobacco Use    Smoking status: Former Smoker    Smokeless tobacco: Never Used   Substance and Sexual Activity    Alcohol use: No    Drug use: No    Sexual activity: Never   Lifestyle    Physical activity:     Days per week: Not on file     Minutes per session: Not on file mildly narrowed with small osteophytes seen. The anterior compartment is within normal limits with small osteophytes seen. There are no loose bodies appreciated. Chondrocalcinosis is seen in the medial and lateral compartments. Assessment & Plan:  75 y.o. female who presents with    Diagnosis Orders   1. Pseudogout of right knee  XR KNEE RIGHT (3 VIEWS)       No orders of the defined types were placed in this encounter. Discussed at length conservative treatment of knee symptoms/arthritis, according to AAOS Clinical Practice Guidelines:  1)  Recommend ice and anti-inflammatories to reduce pain and swelling. Ibuprofen refilled. 2)  Recommend maintaining weight in a healthy range to reduce stresses on the knee, particularly the patellofemoral compartment which sees up to 6x body weight. 3)  Home exercise program printed out, focusing on strengthening, low impact aerobic exercises, and neuromuscular education. May benefit from formal physical therapy, declined for now. 4)  Evidence for intra-articular injections is not as strong, however, they are an option. Injections consist of either corticosteroid or hyaluronic acid viscosupplementation. Informed patient corticosteroid injections can be performed every 4 months as needed, and viscosupplementation every 6 months or so. Previous injections haven't helped. She may benefit from arthroscopic intervention as she does not have advanced degeneration on WB radiographs. She reports doing better with icy hot, will defer for now.     She will call if pain returns/worsens, and if so, will order MRI or formal PT    Tyler Quintero

## 2020-03-12 ENCOUNTER — OFFICE VISIT (OUTPATIENT)
Dept: PRIMARY CARE CLINIC | Age: 75
End: 2020-03-12
Payer: MEDICARE

## 2020-03-12 VITALS
SYSTOLIC BLOOD PRESSURE: 139 MMHG | DIASTOLIC BLOOD PRESSURE: 86 MMHG | HEART RATE: 77 BPM | BODY MASS INDEX: 28.16 KG/M2 | WEIGHT: 169 LBS | HEIGHT: 65 IN

## 2020-03-12 PROBLEM — B35.4 TINEA CORPORIS: Status: ACTIVE | Noted: 2020-03-12

## 2020-03-12 PROCEDURE — 99213 OFFICE O/P EST LOW 20 MIN: CPT | Performed by: INTERNAL MEDICINE

## 2020-03-12 RX ORDER — TERBINAFINE HYDROCHLORIDE 250 MG/1
250 TABLET ORAL DAILY
Qty: 14 TABLET | Refills: 0 | Status: SHIPPED | OUTPATIENT
Start: 2020-03-12 | End: 2020-03-26

## 2020-03-12 ASSESSMENT — PATIENT HEALTH QUESTIONNAIRE - PHQ9
SUM OF ALL RESPONSES TO PHQ9 QUESTIONS 1 & 2: 0
1. LITTLE INTEREST OR PLEASURE IN DOING THINGS: 0
2. FEELING DOWN, DEPRESSED OR HOPELESS: 0
SUM OF ALL RESPONSES TO PHQ QUESTIONS 1-9: 0
SUM OF ALL RESPONSES TO PHQ QUESTIONS 1-9: 0

## 2020-03-12 ASSESSMENT — ENCOUNTER SYMPTOMS
GASTROINTESTINAL NEGATIVE: 1
EYES NEGATIVE: 1
RESPIRATORY NEGATIVE: 1

## 2020-03-12 NOTE — PROGRESS NOTES
Bismark Nava  YOB: 1945    Date of Service:  3/12/2020    Chief Complaint   Patient presents with    Rash     bilateral hands     Rash on the left hand. Rash   This is a chronic problem. The current episode started more than 1 month ago. The problem has been gradually worsening since onset. Location: left hand. The rash is characterized by redness. It is unknown if there was an exposure to a precipitant. The treatment provided no relief. No Known Allergies  Outpatient Medications Marked as Taking for the 3/12/20 encounter (Office Visit) with Óscar Villalobos MD   Medication Sig Dispense Refill    terbinafine (LAMISIL) 250 MG tablet Take 1 tablet by mouth daily for 14 days 14 tablet 0    Menthol, Topical Analgesic, (ICY HOT EX) Apply topically      ibuprofen (ADVIL;MOTRIN) 600 MG tablet Take 1 tablet by mouth every 12 hours as needed 60 tablet 2    clobetasol (TEMOVATE) 0.05 % ointment APPLY EXTERNALLY TO THE AFFECTED AREA TWICE DAILY 30 g 3    hydrochlorothiazide (HYDRODIURIL) 25 MG tablet Take 1 tablet by mouth daily 90 tablet 5    hydrocortisone (V-R HYDROCORTISONE/ALOE) 0.5 % ointment Apply topically 2 times daily.  1 Tube 3    traZODone (DESYREL) 50 MG tablet Take 1 tablet by mouth nightly 30 tablet 1       Immunization History   Administered Date(s) Administered    Influenza, High Dose (Fluzone 65 yrs and older) 02/24/2016, 12/05/2016, 10/01/2018, 09/30/2019    Pneumococcal Conjugate 13-valent ([id number]) 02/24/2016    Pneumococcal Polysaccharide ([id number]) 12/05/2016    Tdap (Boostrix, Adacel) 11/07/2019       Past Medical History:   Diagnosis Date    Arthritis     Hypertension      Past Surgical History:   Procedure Laterality Date    ANKLE FRACTURE SURGERY  2009    ROTATOR CUFF REPAIR Right 8/2/13     Family History   Problem Relation Age of Onset    Cancer Mother         lung    Stroke Father     Cancer Maternal Grandmother     Stroke Maternal Grandfather        Review of Systems:  Review of Systems   Constitutional: Negative. HENT: Negative. Eyes: Negative. Respiratory: Negative. Cardiovascular: Negative. Gastrointestinal: Negative. Genitourinary: Negative. Musculoskeletal: Negative. Skin: Positive for rash. Neurological: Negative. Psychiatric/Behavioral: Negative. Vitals:    03/12/20 1532   BP: 139/86   Pulse: 77   Weight: 169 lb (76.7 kg)   Height: 5' 5\" (1.651 m)     Body mass index is 28.12 kg/m². Wt Readings from Last 3 Encounters:   03/12/20 169 lb (76.7 kg)   01/30/20 170 lb (77.1 kg)   12/10/19 169 lb (76.7 kg)     BP Readings from Last 3 Encounters:   03/12/20 139/86   01/30/20 129/84   12/10/19 (!) 154/89         Physical Exam  Constitutional:       Appearance: She is well-developed. HENT:      Head: Normocephalic and atraumatic. Eyes:      Conjunctiva/sclera: Conjunctivae normal.      Pupils: Pupils are equal, round, and reactive to light. Neck:      Musculoskeletal: Normal range of motion and neck supple. Cardiovascular:      Rate and Rhythm: Normal rate and regular rhythm. Heart sounds: Normal heart sounds. Pulmonary:      Effort: Pulmonary effort is normal.      Breath sounds: Normal breath sounds. Musculoskeletal: Normal range of motion. Skin:     General: Skin is warm and dry. Comments: Left hand rash looks like tinea corporis. Neurological:      Mental Status: She is alert and oriented to person, place, and time. Deep Tendon Reflexes: Reflexes are normal and symmetric. Lab Review   No visits with results within 6 Month(s) from this visit.    Latest known visit with results is:   Orders Only on 03/05/2019   Component Date Value    WBC 03/05/2019 5.5     RBC 03/05/2019 4.42     Hemoglobin 03/05/2019 12.8     Hematocrit 03/05/2019 38.6     MCV 03/05/2019 87.2     MCH 03/05/2019 28.9     MCHC 03/05/2019 33.2     RDW 03/05/2019 14.4     Platelets 94/30/3685 notapplicable      Health Maintenance   Topic Date Due    Shingles Vaccine (1 of 2) 11/09/1994    Colon cancer screen colonoscopy  11/09/1994    A1C test (Diabetic or Prediabetic)  03/05/2020    Potassium monitoring  03/05/2020    Creatinine monitoring  03/05/2020    Annual Wellness Visit (AWV)  11/07/2020    Lipid screen  03/05/2024    DTaP/Tdap/Td vaccine (2 - Td) 11/07/2029    Flu vaccine  Completed    Pneumococcal 65+ years Vaccine  Completed    DEXA (modify frequency per FRAX score)  Addressed    Hepatitis A vaccine  Aged Out    Hepatitis B vaccine  Aged Out    Hib vaccine  Aged Out    Meningococcal (ACWY) vaccine  Aged Out          Assessment/Plan:    Tinea corporis  What appears to be ringworm on the left hand just proximal to the left thumb. Great grandson has 2 dogs. 1. Tinea corporis    - terbinafine (LAMISIL) 250 MG tablet; Take 1 tablet by mouth daily for 14 days  Dispense: 14 tablet; Refill: 0  - 1305 Andrea White DO, Dermatology, [facility]       No follow-ups on file.

## 2020-03-12 NOTE — PATIENT INSTRUCTIONS
Please finish the 250 mg terbinafine prescription. Take 1 tablet daily for 14 days. Follow-up with the dermatologist if you do not see any improvement in 2 weeks. Continue all of your other medications but I would stop using the topical steroid cream on any of the lesions for now. Follow-up with Dr. Lisa Haro in 1 month.

## 2020-06-26 ENCOUNTER — TELEPHONE (OUTPATIENT)
Dept: PRIMARY CARE CLINIC | Age: 75
End: 2020-06-26

## 2020-08-17 ENCOUNTER — TELEPHONE (OUTPATIENT)
Dept: PRIMARY CARE CLINIC | Age: 75
End: 2020-08-17

## 2020-08-17 RX ORDER — DIAPER,BRIEF,INFANT-TODD,DISP
EACH MISCELLANEOUS
Qty: 1 TUBE | Refills: 3 | Status: SHIPPED | OUTPATIENT
Start: 2020-08-17 | End: 2020-09-04 | Stop reason: DRUGHIGH

## 2020-09-03 ENCOUNTER — TELEPHONE (OUTPATIENT)
Dept: PRIMARY CARE CLINIC | Age: 75
End: 2020-09-03

## 2020-09-03 NOTE — TELEPHONE ENCOUNTER
Patient called and said [employer] does not have the Hydrocortisone 0.5% and would like Dr. Bermudez to change it to the Hydrocortisone 1%. Please call and advise.

## 2020-09-04 RX ORDER — DIAPER,BRIEF,INFANT-TODD,DISP
EACH MISCELLANEOUS
Qty: 1 TUBE | Refills: 1 | Status: SHIPPED | OUTPATIENT
Start: 2020-09-04 | End: 2020-09-11

## 2020-09-14 ENCOUNTER — TELEPHONE (OUTPATIENT)
Dept: PRIMARY CARE CLINIC | Age: 75
End: 2020-09-14

## 2020-09-14 RX ORDER — ACETAMINOPHEN 500 MG
500 TABLET ORAL EVERY 6 HOURS PRN
Qty: 90 TABLET | Refills: 1 | Status: SHIPPED | OUTPATIENT
Start: 2020-09-14 | End: 2022-05-18

## 2020-09-14 NOTE — TELEPHONE ENCOUNTER
Patient says ointment does not seem to stop the pain in her hands and she would like previous prescription for pills she took that worked. She did not remember the name of the medication. Please call and advise.

## 2020-09-18 ENCOUNTER — TELEPHONE (OUTPATIENT)
Dept: PRIMARY CARE CLINIC | Age: 75
End: 2020-09-18

## 2020-10-14 ENCOUNTER — OFFICE VISIT (OUTPATIENT)
Dept: PRIMARY CARE CLINIC | Age: 75
End: 2020-10-14
Payer: MEDICARE

## 2020-10-14 VITALS
WEIGHT: 176 LBS | HEIGHT: 65 IN | HEART RATE: 65 BPM | TEMPERATURE: 97.9 F | SYSTOLIC BLOOD PRESSURE: 120 MMHG | BODY MASS INDEX: 29.32 KG/M2 | DIASTOLIC BLOOD PRESSURE: 80 MMHG

## 2020-10-14 PROCEDURE — 99214 OFFICE O/P EST MOD 30 MIN: CPT | Performed by: INTERNAL MEDICINE

## 2020-10-14 PROCEDURE — G0008 ADMIN INFLUENZA VIRUS VAC: HCPCS | Performed by: INTERNAL MEDICINE

## 2020-10-14 PROCEDURE — 90694 VACC AIIV4 NO PRSRV 0.5ML IM: CPT | Performed by: INTERNAL MEDICINE

## 2020-10-14 RX ORDER — TEMAZEPAM 15 MG/1
15 CAPSULE ORAL NIGHTLY PRN
Qty: 30 CAPSULE | Refills: 1 | Status: SHIPPED | OUTPATIENT
Start: 2020-10-14 | End: 2021-04-09 | Stop reason: SDUPTHER

## 2020-10-14 RX ORDER — HYDROCHLOROTHIAZIDE 25 MG/1
25 TABLET ORAL DAILY
Qty: 90 TABLET | Refills: 5 | Status: SHIPPED | OUTPATIENT
Start: 2020-10-14 | End: 2021-07-21

## 2020-10-14 RX ORDER — CLOBETASOL PROPIONATE 0.5 MG/ML
1 LOTION TOPICAL 2 TIMES DAILY
Qty: 1 BOTTLE | Refills: 5 | Status: SHIPPED | OUTPATIENT
Start: 2020-10-14 | End: 2021-07-21

## 2020-10-14 ASSESSMENT — ENCOUNTER SYMPTOMS
CHEST TIGHTNESS: 0
SHORTNESS OF BREATH: 0
CONSTIPATION: 0
DIARRHEA: 0
NAUSEA: 0
ABDOMINAL DISTENTION: 0
ABDOMINAL PAIN: 0
GASTROINTESTINAL NEGATIVE: 1
WHEEZING: 0

## 2020-10-14 NOTE — PROGRESS NOTES
Subjective:      Patient ID: Nick Quijano is a 75 y.o. female. 10/14/2020 Patient presents with: Insomnia trazodone does not help  Rash: hands . Does not wear gloves when washing dishes or cleaning! Check-Up    No falls     No depression     Had Covid Infection 6/20 . Stayed Home . Feeling well now           Last seen  11/7/2019 Patient presents with:  Medicare AWV  Feeling fine except for rt knee pain . To get shots from Ortho              ast seen   4/10/19 Patient presents with:  Leg Pain: whole rt leg hurts  Hypertension: no issues  Tingling: never went forr eMG  Rash        Seen in ER [facility] 4/6/19  For same        Last seen  3/5/19 Patient presents with:  Check-Up: tingling left arm continues . No numbness . no pain                 Works regularly ; [employer] keeping / cleaning           Rash   Pertinent negatives include no diarrhea, fatigue, fever or shortness of breath. \"          Review of Systems   Constitutional: Negative for activity change, appetite change, fatigue, fever and unexpected weight change. Pneumonia Vac 2/16 ; 12/16     Flu vac  10/20    Tdap 11/19    shingrex / script / 10/20   HENT: Positive for hearing loss. Top dentures    Eyes: Positive for visual disturbance. Wears glasses ; Eye exam 1/19   Respiratory: Negative for chest tightness, shortness of breath and wheezing. Does not smoke ( stopped 1998 ) . No Etoh      Cardiovascular:        HTN ; but no CAD     FH of HTN ; Son had MI     Fairly active    Gastrointestinal: Negative. Negative for abdominal distention, abdominal pain, constipation, diarrhea and nausea. Colonoscopy 2013 at [facility]   No FH of Ca colon    Endocrine:        FH of diabetes    Genitourinary: Negative for dysuria, frequency, menstrual problem, urgency and vaginal discharge. Natural Menopause   Last pelvic/ Pap 2013    Mammogram  3/19   Musculoskeletal: Positive for arthralgias (right knee) and gait problem.         DEXA 4/15 . Nl    Skin: Positive for rash (fingers ; hands). Allergic/Immunologic: Negative for food allergies. Neurological: Negative for dizziness and weakness. Psychiatric/Behavioral: Positive for sleep disturbance. Negative for behavioral problems. Objective:   Physical Exam   Constitutional: She is oriented to person, place, and time. She appears well-nourished. No distress. Picked up 7 lbs! Eyes: EOM are normal.   Neck: Neck supple. Cardiovascular: Normal rate, regular rhythm and normal heart sounds. Pulmonary/Chest: Breath sounds normal.   Abdominal: Soft. She exhibits distension. There is no abdominal tenderness. Musculoskeletal: Normal range of motion. General: No edema. Tenderness: rt knee. Comments:        Neurological: She is alert and oriented to person, place, and time. She has normal reflexes. She displays no tremor. No sensory deficit. Gait normal.   Skin: Skin is warm and dry. Rash (hands ; fingers ) noted. Psychiatric: She has a normal mood and affect. Her behavior is normal.   Nursing note and vitals reviewed. Assessment:      Ana Armstrong was seen today for insomnia, rash and check-up. Diagnoses and all orders for this visit:    Physical exam  -     CBC Auto Differential; Future  -     Comprehensive Metabolic Panel; Future  -     Hemoglobin A1C; Future  -     Lipid Panel; Future  -     TSH without Reflex; Future  -     Urinalysis; Future  -     Vitamin D 25 Hydroxy; Future    Essential hypertension Controlled   -     hydroCHLOROthiazide (HYDRODIURIL) 25 MG tablet; Take 1 tablet by mouth daily    Other insomnia     Exercise reg daily ; Ok to try Restoril   -     temazepam (RESTORIL) 15 MG capsule; Take 1 capsule by mouth nightly as needed for Sleep for up to 30 days. Irritant contact dermatitis due to other chemical products wear gloves when washing / cleaning   -     clobetasol propionate 0.05 % LOTN lotion;  Apply 1 applicator topically 2 times daily    Vision abnormalities  -     Amb External Referral To Ophthalmology    Other specified hearing loss of both ears  -     External Referral To Audiology    Encounter for screening mammogram for high-risk patient  -     GEO DIGITAL SCREEN W OR WO CAD BILATERAL; Future    Screen for colon cancer  -     Cologuard (For External Results Only); Future    Flu vaccine need  -     INFLUENZA, QUADV, ADJUVANTED, 65 YRS =, IM, PF, PREFILL SYR, 0.5ML (FLUAD)    Need for shingles vaccine  -     zoster recombinant adjuvanted vaccine [facility]) 50 MCG/0.5ML SUSR injection; Inject 0.5 mLs into the muscle once for 1 dose            Plan:      Self Management Goals    Know which medication is for what condition:   Know side effects of medications, and discuss with doctor   Discuss side effects and instructions on new medications. Barriers to medication compliance addressed. All patient questions answered. Pt voiced understanding.   Know correct dose/frequency of medications  Take medications at the same time each day  Stay current on medication refills  If taking OTC's check with MD/pharmacy first about interactions    Systolic BP < or equal to 304  Diastolic BP < or equal to 85    Current Flu and Pneumonia Vax  Set targets for weight loss 4 lbs per month  Exercise 3-5 times per week  Keep check of weight

## 2020-10-16 ENCOUNTER — TELEPHONE (OUTPATIENT)
Dept: ORTHOPEDIC SURGERY | Age: 75
End: 2020-10-16

## 2020-11-30 ENCOUNTER — TELEPHONE (OUTPATIENT)
Dept: PRIMARY CARE CLINIC | Age: 75
End: 2020-11-30

## 2020-11-30 RX ORDER — KETOCONAZOLE 20 MG/ML
SHAMPOO TOPICAL
Qty: 2 BOTTLE | Refills: 5 | Status: SHIPPED | OUTPATIENT
Start: 2020-11-30

## 2020-11-30 RX ORDER — CLOTRIMAZOLE 1 %
CREAM (GRAM) TOPICAL
Qty: 5 G | Refills: 5 | Status: SHIPPED | OUTPATIENT
Start: 2020-11-30 | End: 2020-12-07

## 2020-11-30 NOTE — TELEPHONE ENCOUNTER
----- Message from Solomon Pelletierj luispriya sent at 11/30/2020 12:27 PM EST -----  Subject: Message to Provider    QUESTIONS  Information for Provider? Pt states has ringworm again in same spot   last time received med. Would like med again. Also   has left lower back pain   bending causes catching   has difficult time standing back up. States has been seen for that before   and received med. Please call pt.  ---------------------------------------------------------------------------  --------------  CALL BACK INFO  What is the best way for the office to contact you? OK to leave message on   voicemail  Preferred Call Back Phone Number? [phone]  ---------------------------------------------------------------------------  --------------  SCRIPT ANSWERS  Relationship to Patient?  Self

## 2021-01-19 ENCOUNTER — NURSE TRIAGE (OUTPATIENT)
Dept: OTHER | Facility: CLINIC | Age: 76
End: 2021-01-19

## 2021-01-19 NOTE — TELEPHONE ENCOUNTER
Reason for Disposition   Tingling (e.g., pins and needles) of the face, arm or leg on one side of the body, that is  present now (Exceptions: chronic/recurrent symptom lasting > 4 weeks or tingling from known cause, such as: bumped elbow, carpal tunnel syndrome, pinched nerve, frostbite)    Answer Assessment - Initial Assessment Questions  1. SYMPTOM: \"What is the main symptom you are concerned about? \" (e.g., weakness, numbness)      Tingling in left arm for over 1 month    2. ONSET: \"When did this start? \" (minutes, hours, days; while sleeping)      Over a month ago    3. LAST NORMAL: \"When was the last time you were normal (no symptoms)? \"      Over a month ago    4. PATTERN \"Does this come and go, or has it been constant since it started? \"  \"Is it present now? \"      Goes away with repositioning arm but then comes right back    5. CARDIAC SYMPTOMS: \"Have you had any of the following symptoms: chest pain, difficulty breathing, palpitations? \"      No    6. NEUROLOGIC SYMPTOMS: \"Have you had any of the following symptoms: headache, dizziness, vision loss, double vision, changes in speech, unsteady on your feet? \"      Slight headache once in awhile, sometimes unsteady on feet    7. OTHER SYMPTOMS: \"Do you have any other symptoms? \"      No    8. PREGNANCY: \"Is there any chance you are pregnant? \" \"When was your last menstrual period? \"      N/A    Protocols used: NEUROLOGIC DEFICIT-ADULT-[place]    Patient called pre-service center [facility] with red flag complaint. Brief description of triage: Tingling to left arm for about 1 month. Triage indicates for patient to be seen in office NOW. Care advice provided, patient verbalizes understanding; denies any other questions or concerns; instructed to call back for any new or worsening symptoms. Writer [facility] for [facility] scheduling. Attention Provider:   Thank you for allowing me to participate in the care of your patient. The patient was connected to triage in response to information provided to the [facility]. Please do not respond through this encounter as the response is not directed to a shared pool.

## 2021-01-20 ENCOUNTER — APPOINTMENT (OUTPATIENT)
Dept: CT IMAGING | Age: 76
End: 2021-01-20
Payer: MEDICARE

## 2021-01-20 ENCOUNTER — APPOINTMENT (OUTPATIENT)
Dept: GENERAL RADIOLOGY | Age: 76
End: 2021-01-20
Payer: MEDICARE

## 2021-01-20 ENCOUNTER — HOSPITAL ENCOUNTER (EMERGENCY)
Age: 76
Discharge: HOME | End: 2021-01-20
Attending: EMERGENCY MEDICINE
Payer: MEDICARE

## 2021-01-20 VITALS
TEMPERATURE: 97.8 F | WEIGHT: 179 LBS | RESPIRATION RATE: 16 BRPM | BODY MASS INDEX: 29.82 KG/M2 | DIASTOLIC BLOOD PRESSURE: 94 MMHG | SYSTOLIC BLOOD PRESSURE: 187 MMHG | HEART RATE: 69 BPM | OXYGEN SATURATION: 100 % | HEIGHT: 65 IN

## 2021-01-20 DIAGNOSIS — R20.2 TINGLING OF LEFT UPPER EXTREMITY: ICD-10-CM

## 2021-01-20 DIAGNOSIS — M47.22 OSTEOARTHRITIS OF SPINE WITH RADICULOPATHY, CERVICAL REGION: Primary | ICD-10-CM

## 2021-01-20 DIAGNOSIS — I10 ESSENTIAL HYPERTENSION: ICD-10-CM

## 2021-01-20 DIAGNOSIS — M50.30 DDD (DEGENERATIVE DISC DISEASE), CERVICAL: ICD-10-CM

## 2021-01-20 LAB
A/G RATIO: 1.4 (ref 1.1–2.2)
ALBUMIN SERPL-MCNC: 4 G/DL (ref 3.4–5)
ALP BLD-CCNC: 93 U/L (ref 40–129)
ALT SERPL-CCNC: 10 U/L (ref 10–40)
ANION GAP SERPL CALCULATED.3IONS-SCNC: 6 MMOL/L (ref 3–16)
AST SERPL-CCNC: 15 U/L (ref 15–37)
BASOPHILS ABSOLUTE: 0 K/UL (ref 0–0.2)
BASOPHILS RELATIVE PERCENT: 0.4 %
BILIRUB SERPL-MCNC: <0.2 MG/DL (ref 0–1)
BUN BLDV-MCNC: 17 MG/DL (ref 7–20)
CALCIUM SERPL-MCNC: 9.7 MG/DL (ref 8.3–10.6)
CHLORIDE BLD-SCNC: 109 MMOL/L (ref 99–110)
CO2: 25 MMOL/L (ref 21–32)
CREAT SERPL-MCNC: 0.7 MG/DL (ref 0.6–1.2)
EOSINOPHILS ABSOLUTE: 0.1 K/UL (ref 0–0.6)
EOSINOPHILS RELATIVE PERCENT: 1.2 %
GFR AFRICAN AMERICAN: >60
GFR NON-AFRICAN AMERICAN: >60
GLOBULIN: 2.9 G/DL
GLUCOSE BLD-MCNC: 84 MG/DL (ref 70–99)
HCT VFR BLD CALC: 38.7 % (ref 36–48)
HEMOGLOBIN: 12.5 G/DL (ref 12–16)
LYMPHOCYTES ABSOLUTE: 2.5 K/UL (ref 1–5.1)
LYMPHOCYTES RELATIVE PERCENT: 47 %
MCH RBC QN AUTO: 28.5 PG (ref 26–34)
MCHC RBC AUTO-ENTMCNC: 32.3 G/DL (ref 31–36)
MCV RBC AUTO: 88.4 FL (ref 80–100)
MONOCYTES ABSOLUTE: 0.5 K/UL (ref 0–1.3)
MONOCYTES RELATIVE PERCENT: 9.9 %
NEUTROPHILS ABSOLUTE: 2.2 K/UL (ref 1.7–7.7)
NEUTROPHILS RELATIVE PERCENT: 41.5 %
PDW BLD-RTO: 15.3 % (ref 12.4–15.4)
PLATELET # BLD: 206 K/UL (ref 135–450)
PMV BLD AUTO: 8.9 FL (ref 5–10.5)
POTASSIUM SERPL-SCNC: 4.4 MMOL/L (ref 3.5–5.1)
PRO-BNP: 79 PG/ML (ref 0–449)
RBC # BLD: 4.38 M/UL (ref 4–5.2)
SODIUM BLD-SCNC: 140 MMOL/L (ref 136–145)
TOTAL PROTEIN: 6.9 G/DL (ref 6.4–8.2)
TROPONIN: <0.01 NG/ML
WBC # BLD: 5.3 K/UL (ref 4–11)

## 2021-01-20 PROCEDURE — 80053 COMPREHEN METABOLIC PANEL: CPT

## 2021-01-20 PROCEDURE — 93005 ELECTROCARDIOGRAM TRACING: CPT | Performed by: EMERGENCY MEDICINE

## 2021-01-20 PROCEDURE — 71045 X-RAY EXAM CHEST 1 VIEW: CPT

## 2021-01-20 PROCEDURE — 84484 ASSAY OF TROPONIN QUANT: CPT

## 2021-01-20 PROCEDURE — 99283 EMERGENCY DEPT VISIT LOW MDM: CPT

## 2021-01-20 PROCEDURE — 85025 COMPLETE CBC W/AUTO DIFF WBC: CPT

## 2021-01-20 PROCEDURE — 83880 ASSAY OF NATRIURETIC PEPTIDE: CPT

## 2021-01-20 PROCEDURE — 72125 CT NECK SPINE W/O DYE: CPT

## 2021-01-20 PROCEDURE — 70450 CT HEAD/BRAIN W/O DYE: CPT

## 2021-01-20 RX ORDER — METHYLPREDNISOLONE 4 MG/1
2 TABLET ORAL DAILY
Qty: 3 TABLET | Refills: 0 | Status: SHIPPED | OUTPATIENT
Start: 2021-01-20 | End: 2021-01-26

## 2021-01-20 RX ORDER — METHOCARBAMOL 500 MG/1
500 TABLET, FILM COATED ORAL 2 TIMES DAILY PRN
Qty: 20 TABLET | Refills: 0 | Status: SHIPPED | OUTPATIENT
Start: 2021-01-20 | End: 2021-01-30

## 2021-01-20 ASSESSMENT — ENCOUNTER SYMPTOMS
SHORTNESS OF BREATH: 0
NAUSEA: 0
CONSTIPATION: 0
WHEEZING: 0
VOMITING: 0
STRIDOR: 0
DIARRHEA: 0
COUGH: 0
COLOR CHANGE: 0
ABDOMINAL PAIN: 0
ABDOMINAL DISTENTION: 0
BACK PAIN: 0

## 2021-01-20 NOTE — ED NOTES
Reviewed discharge instructions with patient. No questions at this time. No sign of distress. AOx3. Pt ambulated to ER exit with steady gait.  Wheelchair offered but patient declined       Hilario Bailey RN  01/20/21 1340

## 2021-01-20 NOTE — ED PROVIDER NOTES
As physician-in-triage, I performed a medical screening history and physical exam on Azeb Estrella. I also cared for and evaluated the patient in conjunction with the ED Advanced Practice Provider. All diagnostic, treatment, and disposition decisions were made by myself in conjunction with the advanced practice provider. For all further details of the patient's emergency department visit, please see the advanced practice provider's documentation. Patient is a right-hand-dominant female with left forearm and arm paresthesias and tingling in left lateral trapezius and neck pain over the last several weeks. Active chest pain pressure acute shortness of breath. No headache. No true neck pain. No fevers or shaking. No rash. Tenderness along the trapezius. No pulse deficit. No motor deficit. Mild dysesthesia. Possible underlying cervical radiculopathy versus brachial plexopathy. Outpatient follow-up for EMG and possible MRI imaging.       Impression: Paresthesias arm left arm, trapezius pain acute on subacute      Arabella Coleman MD  57/85/81 1742       Arabella Coleman MD  42/11/96 7107

## 2021-01-20 NOTE — ED PROVIDER NOTES
[facility]        Pt Name: Leena Goetz  MRN: [id number]  [place] 1945  Date of evaluation: 1/20/2021  Provider: Meghan Escobedo PA-C  PCP: Ronni Garza MD     I have seen and evaluated this patient with my supervising physician Elio Cortez MD.    [address] [place] [address]       Chief Complaint   Patient presents with    Tingling     pt in from home c/o tingling in left arm only for the past 3-4 weeks, pt denies chest pain or SOB, denies double vision or dizziness. HISTORY OF PRESENT ILLNESS   (Location, Timing/Onset, Context/Setting, Quality, Duration, Modifying Factors, Severity, Associated Signs and Symptoms)  Note limiting factors. Leena Goetz is a 67 y.o. female with history of hypertension and arthritis who presents to the emergency department complaining of pins and needle sensation in her left arm for 3 to 4 weeks. It is constant. She denies any pain associated with this. When asked if she has any neck or back pain, she does report a little bit of left-sided neck stiffness but states that this is not unusual for her. The patient denies headache, dizziness, lightheadedness, skin changes, fever, chills, chest pain, shortness of breath, abdominal pain. Nursing Notes were all reviewed and agreed with or any disagreements were addressed in the HPI. REVIEW OF SYSTEMS    (2-9 systems for level 4, 10 or more for level 5)     Review of Systems   Constitutional: Negative for chills and fever. HENT: Negative. Eyes: Negative for visual disturbance. Respiratory: Negative for cough, shortness of breath, wheezing and stridor. Cardiovascular: Negative for chest pain, palpitations and leg swelling. Gastrointestinal: Negative for abdominal distention, abdominal pain, constipation, diarrhea, nausea and vomiting. Musculoskeletal: Positive for neck stiffness.  Negative for back pain and neck pain. Skin: Negative for color change, pallor, rash and wound. Neurological: Negative for dizziness, tremors, seizures, syncope, facial asymmetry, speech difficulty, weakness, light-headedness and headaches. Numbness: tingling/pins needles sensation. Psychiatric/Behavioral: Negative for confusion. All other systems reviewed and are negative. Positives and Pertinent negatives as per HPI. Except as noted above in the ROS, all other systems were reviewed and negative. PAST MEDICAL HISTORY     Past Medical History:   Diagnosis Date    Arthritis     Hypertension          SURGICAL HISTORY     Past Surgical History:   Procedure Laterality Date    ANKLE FRACTURE SURGERY  2009    ROTATOR CUFF REPAIR Right 8/2/13         CURRENTMEDICATIONS       Previous Medications    ACETAMINOPHEN (TYLENOL) 500 MG TABLET    Take 1 tablet by mouth every 6 hours as needed for Pain    CLOBETASOL PROPIONATE 0.05 % LOTN LOTION    Apply 1 applicator topically 2 times daily    HYDROCHLOROTHIAZIDE (HYDRODIURIL) 25 MG TABLET    Take 1 tablet by mouth daily    KETOCONAZOLE (NIZORAL) 2 % SHAMPOO    Apply topically daily as needed. MENTHOL, TOPICAL ANALGESIC, (ICY HOT EX)    Apply topically    TRAZODONE (DESYREL) 50 MG TABLET    Take 1 tablet by mouth nightly         ALLERGIES     Patient has no known allergies.     FAMILYHISTORY       Family History   Problem Relation Age of Onset    Cancer Mother         lung    Stroke Father     Cancer Maternal Grandmother     Stroke Maternal Grandfather           SOCIAL HISTORY       Social History     Tobacco Use    Smoking status: Former Smoker    Smokeless tobacco: Never Used   Substance Use Topics    Alcohol use: No    Drug use: No       SCREENINGS             PHYSICAL EXAM    (up to 7 for level 4, 8 or more for level 5)     ED Triage Vitals [01/20/21 1515]   BP Temp Temp src Pulse Resp SpO2 Height Weight   (!) 161/115 97.8 °F (36.6 °C) -- 69 16 100 % 5' 5\" (1.651 m) 179 lb (81.2 kg)       Physical Exam  Vitals signs and nursing note reviewed. Constitutional:       Appearance: Normal appearance. She is well-developed. She is not toxic-appearing or diaphoretic. HENT:      Head: Normocephalic and atraumatic. Right Ear: External ear normal.      Left Ear: External ear normal.      Nose: Nose normal.      Mouth/Throat:      Mouth: Mucous membranes are moist.      Pharynx: Oropharynx is clear. Eyes:      General: No scleral icterus. Right eye: No discharge. Left eye: No discharge. Extraocular Movements: Extraocular movements intact. Conjunctiva/sclera: Conjunctivae normal.      Pupils: Pupils are equal, round, and reactive to light. Neck:      Musculoskeletal: Normal range of motion. Cardiovascular:      Rate and Rhythm: Normal rate. Pulses:           Carotid pulses are 2+ on the right side and 2+ on the left side. Radial pulses are 2+ on the right side and 2+ on the left side. Pulmonary:      Effort: Pulmonary effort is normal.      Breath sounds: Normal breath sounds. Abdominal:      General: Bowel sounds are normal.      Palpations: Abdomen is soft. Tenderness: There is no abdominal tenderness. There is no right CVA tenderness or left CVA tenderness. Musculoskeletal: Normal range of motion. Left shoulder: Normal.      Left elbow: Normal.      Left wrist: Normal.      Cervical back: Normal.      Thoracic back: Normal.      Lumbar back: Normal.      Left hand: Normal. Normal sensation noted. Normal strength noted. Skin:     General: Skin is warm and dry. Capillary Refill: Capillary refill takes less than 2 seconds. Coloration: Skin is not jaundiced or pale. Findings: No bruising, erythema, lesion or rash. Neurological:      General: No focal deficit present. Mental Status: She is alert and oriented to person, place, and time. Cranial Nerves: No cranial nerve deficit (II-XII intact). Motor: No weakness. Comments: No focal sensory loss. However, patient does report pins/needles sensation to the flexor surface of the left distal upper arm, elbow, and proximal forearm. No pronator drift, facial droop or slurred speech  Normal finger to nose coordination  Normal rapid alternating hand movement  monica hallpike negative without nystagmus  5/5 strength in all four extremities   Psychiatric:         Mood and Affect: Mood normal.         Behavior: Behavior normal.         DIAGNOSTIC RESULTS   LABS:    Labs Reviewed   CBC WITH AUTO DIFFERENTIAL    Narrative:     Performed at:  [facility] [address]   Phone [phone]   COMPREHENSIVE METABOLIC PANEL    Narrative:     Performed at:  [facility] [address]   Phone [phone]   TROPONIN    Narrative:     Performed at:  [facility] [address]   Phone [phone]     Narrative:     Performed at:  [facility] [address]   Phone [phone]       All other labs were within normal range or not returned as of this dictation. EKG: All EKG's are interpreted by the Emergency Department Physician in the absence of a cardiologist.  Please see their note for interpretation of EKG. RADIOLOGY:   Non-plain film images such as CT, Ultrasound and MRI are read by the radiologist. Plain radiographic images are visualized and preliminarily interpreted by the ED Provider with the below findings:        Interpretation per the Radiologist below, if available at the time of this note:    CT CERVICAL SPINE WO CONTRAST   Final Result   No acute fracture. Degenerative changes with grade 1 spondylolistheses. CT HEAD WO CONTRAST   Final Result   No acute intracranial abnormality. Stable compared to the prior study         XR CHEST PORTABLE   Final Result   No evidence of acute process. PROCEDURES   Unless otherwise noted below, none     Procedures    CRITICAL CARE TIME   N/A    CONSULTS:  None      EMERGENCY DEPARTMENT COURSE and DIFFERENTIAL DIAGNOSIS/MDM:   Vitals:    Vitals:    01/20/21 1515 01/20/21 1757   BP: (!) 161/115 (!) 187/94   Pulse: 69    Resp: 16    Temp: 97.8 °F (36.6 °C)    SpO2: 100%    Weight: 179 lb (81.2 kg)    Height: 5' 5\" (1.651 m)        Patient was given the following medications:  Medications - No data to display        This patient presents to the emergency department complaining of persistent tingling sensation in her left arm for several weeks. She has no weakness associated with this. She has great pulses and capillary refill. She reports a little bit of left-sided neck stiffness. CT head is stable. CT cervical spine shows both degenerative disc and joint disease with slight disc protrusion. She has no weakness in the extremity. She will be sent home with course of Medrol Dosepak and a muscle relaxant. Patient is advised to follow-up with PCP, neurosurgeon for degenerative findings, and neurologist for an outpatient EMG.   My suspicion is low for cerebellar compromise, posterior stroke,carotid dissection, sinus abscess, acute fracture, acute CVA, ICH, SAH, TIA, meningitis, encephalitis, pseudotumor cerebri, temporal arteritis, sentinel bleed from ruptured aneurysm, hypertensive urgency or emergency, subdural hematoma, epidural hematoma,subungual hematoma, paronychia, eponychia, felon, flexor tenosynovitis,SVC syndrome, foreign body, tendon rupture, compartment syndrome, acute fracture, dislocation, DVT, arterial compromise or occlusion, limb ischemia, gout, septic joint, abscess, cellulitis, osteomyelitis, pneumonia, pneumothorax, COVID-19,ACS, PE, myocarditis, pericarditis, endocarditis, acute pulmonary edema, pleural effusion, pericardial effusion, cardiac tamponade, cardiomyopathy, CHF exacerbation, thoracic aortic dissection, esophageal rupture, other life-threatening arrhythmia, hypertensive urgency or emergency, hemothorax, pulmonary contusion, subcutaneous emphysema, flail chest, pneumo mediastinum, rib fracture, cauda equina, central cord syndrome, acute spine fracture or dislocation, epidural abscess or hematoma, discitis, meningitis, encephalitis, transverse myelitis, pyelonephritis, perinephric abscess, urosepsis, kidney stone, AAA, dissection, shingles, or other concerning pathology. we have addressed concerns and expectations. She is stable for discharge. FINAL IMPRESSION      1. Osteoarthritis of spine with radiculopathy, cervical region    2. Tingling of left upper extremity    3. Essential hypertension    4.  DDD (degenerative disc disease), cervical          DISPOSITION/PLAN   DISPOSITION Decision To Discharge 01/20/2021 06:24:49 PM      PATIENT REFERREDTO:  Enrico Soto MD  [address] [phone] 11    In 3 days      Heri Estevez MD  [address] [place] [address], [facility] [address] / [facility] [phone]      for neurology follow up for outpatient EMG    [facility] Emergency Department  [address] [place] [address]  [phone]    If symptoms worsen    Bridgette Villa MD  [address]  [phone]      for neurosurgery follow up for degenerative disc disease in the neck      DISCHARGE MEDICATIONS:  New Prescriptions    METHOCARBAMOL (ROBAXIN) 500 MG TABLET    Take 1 tablet by mouth 2 times daily as needed (neck spasm)    METHYLPREDNISOLONE (MEDROL) 4 MG TABLET    Take 0.5 tablets by mouth daily for 6 days       DISCONTINUED MEDICATIONS:  Discontinued Medications    No medications on file              (Please note that portions of this note were completed with a voice recognition program.  Efforts were made to edit the dictations but occasionally words are mis-transcribed.)    Brain Nassar PA-C (electronically signed)           Brain Nassar PA-C  01/20/21 7924

## 2021-01-21 LAB
EKG ATRIAL RATE: 61 BPM
EKG DIAGNOSIS: NORMAL
EKG P AXIS: 48 DEGREES
EKG P-R INTERVAL: 162 MS
EKG Q-T INTERVAL: 414 MS
EKG QRS DURATION: 82 MS
EKG QTC CALCULATION (BAZETT): 416 MS
EKG R AXIS: 9 DEGREES
EKG T AXIS: 36 DEGREES
EKG VENTRICULAR RATE: 61 BPM

## 2021-01-21 PROCEDURE — 93010 ELECTROCARDIOGRAM REPORT: CPT | Performed by: INTERNAL MEDICINE

## 2021-04-09 ENCOUNTER — TELEPHONE (OUTPATIENT)
Dept: PRIMARY CARE CLINIC | Age: 76
End: 2021-04-09

## 2021-04-09 DIAGNOSIS — G47.09 OTHER INSOMNIA: ICD-10-CM

## 2021-04-09 DIAGNOSIS — G47.00 INSOMNIA, UNSPECIFIED TYPE: ICD-10-CM

## 2021-04-09 DIAGNOSIS — Q15.9 EYE ABNORMALITIES: Primary | ICD-10-CM

## 2021-04-09 RX ORDER — TEMAZEPAM 15 MG/1
15 CAPSULE ORAL NIGHTLY PRN
Qty: 30 CAPSULE | Refills: 1 | Status: SHIPPED | OUTPATIENT
Start: 2021-04-09 | End: 2021-05-09

## 2021-04-09 RX ORDER — FEXOFENADINE HCL 180 MG/1
180 TABLET ORAL DAILY
Qty: 30 TABLET | Refills: 1 | Status: SHIPPED | OUTPATIENT
Start: 2021-04-09 | End: 2021-05-09

## 2021-04-09 NOTE — TELEPHONE ENCOUNTER
----- Message from Connie Tariq sent at 4/9/2021  8:30 AM EDT -----  Subject: Message to Provider    QUESTIONS  Information for Provider? Patient has a crest that is forming around the   corner of her left eye she has no drainage   no pain cleans eye area and it continues to come back throughout the day. Would like to schedule to be seeing she contacted a eye doctor that   advised pt to see PCP.  ---------------------------------------------------------------------------  --------------  CALL BACK INFO  What is the best way for the office to contact you? OK to leave message on   voicemail  Preferred Call Back Phone Number? [phone]  ---------------------------------------------------------------------------  --------------  SCRIPT ANSWERS  Relationship to Patient? Self  Appointment reason? Symptomatic  Select script based on patient symptoms? Adult Eye Problem [Pink Eye   Conjunctivitis   Glaucoma   Macular Degeneration]   Are you having eye pain? No  Have you had an injury or trauma? No  Have you had loss of vision? No  Are you having a headache? No  Do you have a chronic eye condition? No  Have you seen a provider for these symptoms within the last 14 days?  Yes

## 2021-04-13 RX ORDER — TRAZODONE HYDROCHLORIDE 50 MG/1
50 TABLET ORAL NIGHTLY
Qty: 30 TABLET | Refills: 1 | Status: SHIPPED | OUTPATIENT
Start: 2021-04-13 | End: 2021-06-09

## 2021-05-19 ENCOUNTER — HOSPITAL ENCOUNTER (OUTPATIENT)
Dept: VASCULAR LAB | Age: 76
Discharge: HOME | End: 2021-05-19
Payer: MEDICARE

## 2021-05-19 ENCOUNTER — NURSE TRIAGE (OUTPATIENT)
Dept: OTHER | Facility: CLINIC | Age: 76
End: 2021-05-19

## 2021-05-19 DIAGNOSIS — M79.89 LEFT LEG SWELLING: Primary | ICD-10-CM

## 2021-05-19 DIAGNOSIS — M79.89 LEG SWELLING: Primary | ICD-10-CM

## 2021-05-19 DIAGNOSIS — M79.89 LEFT LEG SWELLING: ICD-10-CM

## 2021-05-19 PROCEDURE — 93971 EXTREMITY STUDY: CPT

## 2021-05-19 NOTE — TELEPHONE ENCOUNTER
Received call from Krzysztof Sigala  at pre-service center [facility]) Richard with Red Flag Complaint. Brief description of triage: see below. After second level Triage with Dr. Ladi Guthrie patient was instructed to go to the hospital to get an ultrasound of her left leg and then follow up with Dr. Ladi Guthrie if that is negative. Care advice provided, patient verbalizes understanding; denies any other questions or concerns; instructed to call back for any new or worsening symptoms. Writer provided warm transfer to Myrna Benitez at [facility] for appointment scheduling. Attention Provider: Thank you for allowing me to participate in the care of your patient. The patient was connected to triage in response to information provided to the ECC. Please do not respond through this encounter as the response is not directed to a shared pool. Reason for Disposition   Thigh, calf, or ankle swelling in only one leg    Answer Assessment - Initial Assessment Questions  1. ONSET: \"When did the swelling start? \" (e.g., minutes, hours, days)  4-5 days ago. 2. LOCATION: \"What part of the leg is swollen? \"  \"Are both legs swollen or just one leg? \"     Left leg swelling in her lower leg. 3. SEVERITY: \"How bad is the swelling? \" (e.g., localized; mild, moderate, severe)   - Localized - small area of swelling localized to one leg   - MILD pedal edema - swelling limited to foot and ankle, pitting edema < 1/4 inch (6 mm) deep, rest and elevation eliminate most or all swelling   - MODERATE edema - swelling of lower leg to knee, pitting edema > 1/4 inch (6 mm) deep, rest and elevation only partially reduce swelling   - SEVERE edema - swelling extends above knee, facial or hand swelling present      Moderate. 4. REDNESS: \"Does the swelling look red or infected? \"      None. 5. PAIN: \"Is the swelling painful to touch? \" If so, ask: \"How painful is it? \"   (Scale 1-10; mild, moderate or severe)     2-3    6. FEVER: \"Do you have a fever? \"

## 2021-06-08 DIAGNOSIS — G47.00 INSOMNIA, UNSPECIFIED TYPE: ICD-10-CM

## 2021-06-09 RX ORDER — TRAZODONE HYDROCHLORIDE 50 MG/1
TABLET ORAL
Qty: 30 TABLET | Refills: 1 | Status: SHIPPED | OUTPATIENT
Start: 2021-06-09 | End: 2021-07-21

## 2021-06-09 NOTE — TELEPHONE ENCOUNTER
Medication:   Requested Prescriptions     Pending Prescriptions Disp Refills    traZODone (DESYREL) 50 MG tablet [Pharmacy Med Name: TRAZODONE 50MG TABLETS] 30 tablet 1     Sig: TAKE 1 TABLET BY MOUTH EVERY NIGHT        Last Filled:      Patient Phone Number: [phone] (home) [phone] (work)    Last appt: 10/14/2020   Next appt: Visit date not found    Last OARRS: No flowsheet data found.

## 2021-07-21 ENCOUNTER — OFFICE VISIT (OUTPATIENT)
Dept: PRIMARY CARE CLINIC | Age: 76
End: 2021-07-21
Payer: MEDICARE

## 2021-07-21 VITALS
HEART RATE: 78 BPM | TEMPERATURE: 97.9 F | BODY MASS INDEX: 29.46 KG/M2 | SYSTOLIC BLOOD PRESSURE: 140 MMHG | DIASTOLIC BLOOD PRESSURE: 80 MMHG | WEIGHT: 176.8 LBS | HEIGHT: 65 IN | OXYGEN SATURATION: 98 %

## 2021-07-21 DIAGNOSIS — Z11.59 ENCOUNTER FOR HCV SCREENING TEST FOR LOW RISK PATIENT: ICD-10-CM

## 2021-07-21 DIAGNOSIS — Z00.00 PHYSICAL EXAM: ICD-10-CM

## 2021-07-21 DIAGNOSIS — Z12.31 ENCOUNTER FOR SCREENING MAMMOGRAM FOR HIGH-RISK PATIENT: ICD-10-CM

## 2021-07-21 DIAGNOSIS — R35.0 FREQUENCY OF URINATION: ICD-10-CM

## 2021-07-21 DIAGNOSIS — Z01.818 PREOP EXAMINATION: ICD-10-CM

## 2021-07-21 DIAGNOSIS — G47.09 OTHER INSOMNIA: ICD-10-CM

## 2021-07-21 LAB
A/G RATIO: 1.7 (ref 1.1–2.2)
ALBUMIN SERPL-MCNC: 4.2 G/DL (ref 3.4–5)
ALP BLD-CCNC: 72 U/L (ref 40–129)
ALT SERPL-CCNC: 8 U/L (ref 10–40)
ANION GAP SERPL CALCULATED.3IONS-SCNC: 14 MMOL/L (ref 3–16)
AST SERPL-CCNC: 14 U/L (ref 15–37)
BILIRUB SERPL-MCNC: 0.3 MG/DL (ref 0–1)
BILIRUBIN URINE: NEGATIVE
BLOOD, URINE: NEGATIVE
BUN BLDV-MCNC: 11 MG/DL (ref 7–20)
CALCIUM SERPL-MCNC: 9.8 MG/DL (ref 8.3–10.6)
CHLORIDE BLD-SCNC: 108 MMOL/L (ref 99–110)
CLARITY: CLEAR
CO2: 22 MMOL/L (ref 21–32)
COLOR: NORMAL
CREAT SERPL-MCNC: 0.7 MG/DL (ref 0.6–1.2)
GFR AFRICAN AMERICAN: >60
GFR NON-AFRICAN AMERICAN: >60
GLOBULIN: 2.5 G/DL
GLUCOSE BLD-MCNC: 89 MG/DL (ref 70–99)
GLUCOSE URINE: NEGATIVE MG/DL
HEPATITIS C ANTIBODY INTERPRETATION: NORMAL
KETONES, URINE: NEGATIVE MG/DL
LEUKOCYTE ESTERASE, URINE: NEGATIVE
MICROSCOPIC EXAMINATION: NORMAL
NITRITE, URINE: NEGATIVE
PH UA: 5.5 (ref 5–8)
POTASSIUM SERPL-SCNC: 4.4 MMOL/L (ref 3.5–5.1)
PROTEIN UA: NEGATIVE MG/DL
SODIUM BLD-SCNC: 144 MMOL/L (ref 136–145)
SPECIFIC GRAVITY UA: 1.02 (ref 1–1.03)
TOTAL PROTEIN: 6.7 G/DL (ref 6.4–8.2)
URINE TYPE: NORMAL
UROBILINOGEN, URINE: 0.2 E.U./DL
VITAMIN D 25-HYDROXY: 27.3 NG/ML

## 2021-07-21 PROCEDURE — 99214 OFFICE O/P EST MOD 30 MIN: CPT | Performed by: INTERNAL MEDICINE

## 2021-07-21 RX ORDER — TEMAZEPAM 30 MG/1
30 CAPSULE ORAL NIGHTLY PRN
Qty: 30 CAPSULE | Refills: 1 | Status: SHIPPED | OUTPATIENT
Start: 2021-07-21 | End: 2021-08-20

## 2021-07-21 SDOH — ECONOMIC STABILITY: FOOD INSECURITY: WITHIN THE PAST 12 MONTHS, THE FOOD YOU BOUGHT JUST DIDN'T LAST AND YOU DIDN'T HAVE MONEY TO GET MORE.: NEVER TRUE

## 2021-07-21 SDOH — ECONOMIC STABILITY: FOOD INSECURITY: WITHIN THE PAST 12 MONTHS, YOU WORRIED THAT YOUR FOOD WOULD RUN OUT BEFORE YOU GOT MONEY TO BUY MORE.: NEVER TRUE

## 2021-07-21 ASSESSMENT — PATIENT HEALTH QUESTIONNAIRE - PHQ9
1. LITTLE INTEREST OR PLEASURE IN DOING THINGS: 0
2. FEELING DOWN, DEPRESSED OR HOPELESS: 0
SUM OF ALL RESPONSES TO PHQ QUESTIONS 1-9: 0
SUM OF ALL RESPONSES TO PHQ QUESTIONS 1-9: 0
SUM OF ALL RESPONSES TO PHQ9 QUESTIONS 1 & 2: 0
SUM OF ALL RESPONSES TO PHQ QUESTIONS 1-9: 0

## 2021-07-21 ASSESSMENT — ENCOUNTER SYMPTOMS
ABDOMINAL PAIN: 0
GASTROINTESTINAL NEGATIVE: 1
NAUSEA: 0
SHORTNESS OF BREATH: 0
ABDOMINAL DISTENTION: 0
CHEST TIGHTNESS: 0
WHEEZING: 0
CONSTIPATION: 0
DIARRHEA: 0

## 2021-07-21 ASSESSMENT — SOCIAL DETERMINANTS OF HEALTH (SDOH): HOW HARD IS IT FOR YOU TO PAY FOR THE VERY BASICS LIKE FOOD, HOUSING, MEDICAL CARE, AND HEATING?: NOT HARD AT ALL

## 2021-07-21 NOTE — PROGRESS NOTES
Subjective:      Patient ID: Danish Verde is a 67 y.o. female. 7/21/2021 Patient presents with:  Pre-op Exam: cataract surgery 7/27/21- left eye, Dr Carine Randolph    Having freq of urination . Believes it is from hctz . Admits she does not take Hctz regularly because of frequency . Has not taken it this am     No known h/o anaesthesia problems or bleeding disorders     Insomnia still an issue           Last seen  10/14/2020 Patient presents with: Insomnia trazodone does not help  Rash: hands . Does not wear gloves when washing dishes or cleaning! Check-Up    No falls     No depression     Had Covid Infection 6/20 . Stayed Home . Feeling well now           Last seen  11/7/2019 Patient presents with:  Medicare AWV  Feeling fine except for rt knee pain . To get shots from Ortho              ast seen   4/10/19 Patient presents with:  Leg Pain: whole rt leg hurts  Hypertension: no issues  Tingling: never went forr eMG  Rash        Seen in ER [facility] 4/6/19  For same        Last seen  3/5/19 Patient presents with:  Check-Up: tingling left arm continues . No numbness . 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 \"          Review of Systems   Constitutional: Negative for activity change, appetite change, fatigue, fever and unexpected weight change. Pneumonia Vac 2/16 ; 12/16     Flu vac  10/20    Tdap 11/19    shingrex / script / 10/20    Covid Vac 3/21   HENT: Positive for hearing loss. Top dentures    Eyes: Positive for visual disturbance. Wears glasses ; Eye exam  7/21  Due for cataract surgery   Respiratory: Negative for chest tightness, shortness of breath and wheezing. Does not smoke ( stopped 1998 ) . No Etoh     No asthma    Cardiovascular:        HTN ; but no CAD     FH of HTN ; Son had MI     Fairly active    Gastrointestinal: Negative.   Negative for abdominal distention, abdominal pain, constipation, diarrhea and nausea. Colonoscopy 2013 at [facility]   No FH of Ca colon    Endocrine:        FH of diabetes    Genitourinary: Positive for frequency. Negative for dysuria, menstrual problem, urgency and vaginal discharge. Natural Menopause   Last pelvic/ Pap 2013    Mammogram  3/19   Musculoskeletal: Positive for arthralgias. DEXA 4/15 . Nl    Skin: Positive for rash. Allergic/Immunologic: Negative for food allergies. Neurological: Negative for dizziness and weakness. Psychiatric/Behavioral: Positive for sleep disturbance. Negative for behavioral problems. Objective:   Physical Exam  Vitals and nursing note reviewed. Constitutional:       General: She is not in acute distress. Eyes:      Extraocular Movements: Extraocular movements intact. Cardiovascular:      Rate and Rhythm: Normal rate and regular rhythm. Heart sounds: Normal heart sounds. Pulmonary:      Breath sounds: Normal breath sounds. Abdominal:      General: There is distension. Palpations: Abdomen is soft. Tenderness: There is no abdominal tenderness. Musculoskeletal:         General: Normal range of motion. Cervical back: Normal range of motion and neck supple. Comments:        Skin:     General: Skin is warm and dry. Neurological:      Mental Status: She is alert and oriented to person, place, and time. Sensory: No sensory deficit. Psychiatric:         Behavior: Behavior normal.         Assessment:      Rebeca Ann was seen today for pre-op exam.    Diagnoses and all orders for this visit:    Preop examination  Cleared for cataract Surgery     Encounter for screening mammogram for high-risk patient  -     GEO DIGITAL SCREEN W OR WO CAD BILATERAL; Future    Other insomnia  Try higher dose Restoril   -     temazepam (RESTORIL) 30 MG capsule; Take 1 capsule by mouth nightly as needed for Sleep for up to 30 days.     Frequency of urination

## 2021-07-22 LAB
ESTIMATED AVERAGE GLUCOSE: 114 MG/DL
HBA1C MFR BLD: 5.6 %

## 2021-07-22 RX ORDER — ERGOCALCIFEROL 1.25 MG/1
50000 CAPSULE ORAL WEEKLY
Qty: 12 CAPSULE | Refills: 1 | Status: SHIPPED | OUTPATIENT
Start: 2021-07-22 | End: 2021-07-22

## 2021-07-22 RX ORDER — ERGOCALCIFEROL 1.25 MG/1
CAPSULE ORAL
Qty: 13 CAPSULE | Refills: 5 | Status: SHIPPED | OUTPATIENT
Start: 2021-07-22

## 2021-07-22 NOTE — TELEPHONE ENCOUNTER
Medication:   Requested Prescriptions     Pending Prescriptions Disp Refills    vitamin D (ERGOCALCIFEROL) 1.25 MG (34901 UT) CAPS capsule [Pharmacy Med Name: VITAMIN D2 50,000IU (ERGO) CAP RX] 13 capsule      Sig: TAKE 1 CAPSULE BY MOUTH 1 TIME A WEEK       Last Filled:      Patient Phone Number: [phone] (home) [phone] (work)    Last appt: 7/21/2021   Next appt: Visit date not found    Last Labs DM:   Lab Results   Component Value Date    VITD25 27.3 07/21/2021    VITD25 17.2 03/05/2019

## 2021-12-28 ENCOUNTER — TELEPHONE (OUTPATIENT)
Dept: PRIMARY CARE CLINIC | Age: 76
End: 2021-12-28

## 2021-12-28 RX ORDER — CLOTRIMAZOLE AND BETAMETHASONE DIPROPIONATE 10; .64 MG/G; MG/G
CREAM TOPICAL
Qty: 10 G | Refills: 3 | Status: SHIPPED | OUTPATIENT
Start: 2021-12-28 | End: 2022-05-18 | Stop reason: SDUPTHER

## 2021-12-28 NOTE — TELEPHONE ENCOUNTER
Pt has a current c/o re-occuring bilat hand itching,no swelling or redness. Onset:2 weeks ago  Pt has not tried anything OTC  Pt states that she has been prescribed something for this in the past although she is unclear as to what the name of that medication was. Any suggestions? pls advise. thank you!     Carmen Brock: [phone]    PT PAULA: [phone]

## 2022-03-21 ENCOUNTER — HOSPITAL ENCOUNTER (EMERGENCY)
Age: 77
Discharge: HOME | End: 2022-03-21
Payer: COMMERCIAL

## 2022-03-21 ENCOUNTER — APPOINTMENT (OUTPATIENT)
Dept: GENERAL RADIOLOGY | Age: 77
End: 2022-03-21
Payer: COMMERCIAL

## 2022-03-21 VITALS
DIASTOLIC BLOOD PRESSURE: 100 MMHG | SYSTOLIC BLOOD PRESSURE: 125 MMHG | HEIGHT: 65 IN | TEMPERATURE: 98.6 F | BODY MASS INDEX: 24.99 KG/M2 | WEIGHT: 150 LBS | HEART RATE: 71 BPM | OXYGEN SATURATION: 99 %

## 2022-03-21 DIAGNOSIS — S63.502A SPRAIN OF LEFT WRIST, INITIAL ENCOUNTER: Primary | ICD-10-CM
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
## 2022-03-21 DIAGNOSIS — S50.02XA CONTUSION OF LEFT ELBOW, INITIAL ENCOUNTER: ICD-10-CM

## 2022-03-21 PROCEDURE — 99283 EMERGENCY DEPT VISIT LOW MDM: CPT

## 2022-03-21 PROCEDURE — 73080 X-RAY EXAM OF ELBOW: CPT

## 2022-03-21 PROCEDURE — 6370000000 HC RX 637 (ALT 250 FOR IP): Performed by: NURSE PRACTITIONER

## 2022-03-21 PROCEDURE — 73110 X-RAY EXAM OF WRIST: CPT

## 2022-03-21 RX ORDER — IBUPROFEN 600 MG/1
600 TABLET ORAL ONCE
Status: COMPLETED | OUTPATIENT
Start: 2022-03-21 | End: 2022-03-21

## 2022-03-21 RX ORDER — NAPROXEN 500 MG/1
500 TABLET ORAL 2 TIMES DAILY WITH MEALS
Qty: 10 TABLET | Refills: 0 | Status: SHIPPED | OUTPATIENT
Start: 2022-03-21 | End: 2022-04-24

## 2022-03-21 RX ADMIN — IBUPROFEN 600 MG: 600 TABLET ORAL at 17:27

## 2022-03-21 ASSESSMENT — PAIN SCALES - GENERAL
PAINLEVEL_OUTOF10: 7
PAINLEVEL_OUTOF10: 7

## 2022-03-21 ASSESSMENT — PAIN DESCRIPTION - PAIN TYPE: TYPE: ACUTE PAIN

## 2022-03-21 ASSESSMENT — PAIN DESCRIPTION - LOCATION: LOCATION: HAND;WRIST;ARM

## 2022-03-21 ASSESSMENT — PAIN - FUNCTIONAL ASSESSMENT: PAIN_FUNCTIONAL_ASSESSMENT: 0-10

## 2022-03-21 ASSESSMENT — PAIN DESCRIPTION - ORIENTATION: ORIENTATION: LEFT

## 2022-03-21 NOTE — Clinical Note
Heaven Mccormick was seen and treated in our emergency department on 3/21/2022. She may return to work on 03/24/2022. If you have any questions or concerns, please don't hesitate to call.       Sylvan Lundborg, APRN - CNP

## 2022-03-23 NOTE — ED PROVIDER NOTES
[facility]        Pt Name: Екатерина Bailey  MRN: [id number]  [place] 1945  Date of evaluation: 3/21/2022  Provider: TAE Hassan - CNP  PCP: Ryan Shah MD  Note Started: 11:19 PM EDT       TARA. I have evaluated this patient. My supervising physician was available for consultation. CHIEF COMPLAINT       Chief Complaint   Patient presents with    Fall     pt caught her foot up in a skid and tripped, catching her self with her left arm.  pain to left arm/wrist/hand did not hit her head       HISTORY OF PRESENT ILLNESS   (Location, Timing/Onset, Context/Setting, Quality, Duration, Modifying Factors, Severity, Associated Signs and Symptoms)  Note limiting factors. Chief Complaint: Albin Silverio is a 67 y.o. female history of arthritis hypertension presents emergency department with complaints of a fall. Patient states she was at work when the incident occurred. She had caught her foot in a skid tripped and fell backwards and extended her left hand to attempt to catch herself. She did sustain injury to her left hand and wrist, although denies any head trauma or LOC. She denies being anticoagulated. She is right-hand dominant. Denies any additional injuries to the head, neck, back, chest, shoulder, or hip. Is any treatment prior to arrival.    Nursing Notes were all reviewed and agreed with or any disagreements were addressed in the HPI. REVIEW OF SYSTEMS    (2-9 systems for level 4, 10 or more for level 5)     Review of Systems    Positives and Pertinent negatives as per HPI. Except as noted above in the ROS, all other systems were reviewed and negative.        PAST MEDICAL HISTORY     Past Medical History:   Diagnosis Date    Arthritis     Hypertension          SURGICAL HISTORY     Past Surgical History:   Procedure Laterality Date    ANKLE FRACTURE SURGERY  2009    ROTATOR CUFF normal weight. HENT:      Head: Normocephalic and atraumatic. Nose: Nose normal.   Eyes:      General:         Right eye: No discharge. Left eye: No discharge. Cardiovascular:      Pulses:           Radial pulses are 2+ on the right side and 2+ on the left side. Pulmonary:      Effort: Pulmonary effort is normal. No respiratory distress. Musculoskeletal:      Right shoulder: Normal.      Left shoulder: Normal.      Left elbow: Tenderness present in olecranon process. Right forearm: Normal.      Left forearm: Normal.      Right wrist: Normal.      Left wrist: Tenderness and bony tenderness present. No swelling, lacerations or snuff box tenderness. Decreased range of motion. Arms:       Cervical back: Normal range of motion. Skin:     General: Skin is warm and dry. Coloration: Skin is not pale. Neurological:      General: No focal deficit present. Mental Status: She is alert and oriented to person, place, and time. Sensory: Sensation is intact. Gait: Gait is intact. Comments: Grasp strength 5/5   Psychiatric:         Behavior: Behavior normal. Behavior is cooperative. DIAGNOSTIC RESULTS   LABS:    Labs Reviewed - No data to display    When ordered only abnormal lab results are displayed. All other labs were within normal range or not returned as of this dictation. EKG: When ordered, EKG's are interpreted by the Emergency Department Physician in the absence of a cardiologist.  Please see their note for interpretation of EKG. RADIOLOGY:   Non-plain film images such as CT, Ultrasound and MRI are read by the radiologist. Plain radiographic images are visualized and preliminarily interpreted by the ED Provider with the below findings:        Interpretation per the Radiologist below, if available at the time of this note:    XR WRIST LEFT (MIN 3 VIEWS)   Final Result   No fracture or dislocation.   Soft tissue swelling about the wrist.         XR ELBOW LEFT (MIN 3 VIEWS)   Final Result      Minimal osteophyte from the coronoid process may suggest minimal   osteoarthritis. No evidence of fracture or malalignment. XR ELBOW LEFT (MIN 3 VIEWS)    Result Date: 3/21/2022  EXAM: XR Left Elbow Complete, 3 or More Views EXAM DATE/TIME: 3/21/2022 5:39 pm CLINICAL HISTORY: ORDERING SYSTEM PROVIDED  injury  TECHNOLOGIST PROVIDED HISTORY:  Reason for exam:->injury TECHNIQUE: Frontal, lateral and oblique views of the left elbow. COMPARISON: No relevant prior studies available. FINDINGS: Bones/joints:  Minimal osteophyte from the coronoid process may suggest minimal osteoarthritis. Chronic ossification probable ligament or tendon insertions on the medial humeral epicondyle. No acute fracture. No dislocation. Soft tissues:  See above. Minimal osteophyte from the coronoid process may suggest minimal osteoarthritis. No evidence of fracture or malalignment. XR WRIST LEFT (MIN 3 VIEWS)    Result Date: 3/21/2022  EXAMINATION: XRAY VIEWS OF THE LEFT WRIST 3/21/2022 5:39 pm COMPARISON: None. HISTORY: ORDERING SYSTEM PROVIDED HISTORY: Injury TECHNOLOGIST PROVIDED HISTORY: Reason for exam:->Injury Reason for Exam: fall FINDINGS: No acute fracture or dislocation. Moderate osteoarthritis of the triscaphe joint. Vascular calcifications are seen in the volar wrist.  Soft tissue swelling about the wrist.     No fracture or dislocation.   Soft tissue swelling about the wrist.           PROCEDURES   Unless otherwise noted below, none     Procedures    CRITICAL CARE TIME       CONSULTS:  None      EMERGENCY DEPARTMENT COURSE and DIFFERENTIAL DIAGNOSIS/MDM:   Vitals:    Vitals:    03/21/22 1659   BP: (!) 125/100   Pulse: 71   Temp: 98.6 °F (37 °C)   TempSrc: Oral   SpO2: 99%   Weight: 150 lb (68 kg)   Height: 5' 5\" (1.651 m)       Patient was given the following medications:  Medications   ibuprofen (ADVIL;MOTRIN) tablet 600 mg (600 mg Oral Given 3/21/22 1727) Care of this patient took place during the COVID-19 pandemic emergency. ED COURSE & MEDICAL DECISION MAKING    - The patient presented to the ER with complaints of  fall. Vital signs were reviewed. Exam well-developed, well-nourished female who appears uncomfortable. Imaging ordered. - Pertinent Labs & Imaging studies reviewed. (See chart for details)   -  Patient seen and evaluated in the emergency department. -  Triage and nursing notes reviewed and incorporated. -  Old chart records reviewed and incorporated. -  TARA. I have evaluated this patient. My supervising physician was available for consultation.  -  Differential diagnosis includes: , Strain, fracture, dislocation, contusion, abrasion, laceration, subluxation, open fracture, compartment syndrome, sprain, versus COVID-19  -  Work-up included:  See above  -  ED treatment included:   Ice, Motrin, wrist splint  -  Results discussed with patient. Crystal Prajapatisin is a 79-year-old female with complaints of a mechanical fall that occurred this afternoon. She fell backwards and landed on outstretched left hand. She sustained injury to left wrist and left elbow. She is right-hand dominant. Denies any head trauma or LOC. This incident occurred at work. On exam she does have tenderness to the wrist at the distal radial aspect. She does have an area of ecchymosis to the olecranon process. Neurovascular status intact. Radial pulse 2+ bilateral.  Hand grasp strength 5/5. No obvious deformity noted. Imaging is obtained. X-ray left wrist shows no fracture or dislocation. Soft tissue swelling about the wrist.  X-ray left elbow the minimal osteophyte from the coronoid process may suggest minimal osteoarthritis. No evidence for fracture or malalignment. Patient was informed of these results. Velcro wrist splint is placed. She was instructed on home treatment care to include home RICE therapy.   She was given strict return discharge instructions. Shared decision making is completed patient stable for discharge at this time    FINAL IMPRESSION      1. Sprain of left wrist, initial encounter    2. Fall, initial encounter    3.  Contusion of left elbow, initial encounter          DISPOSITION/PLAN   DISPOSITION Decision To Discharge 03/21/2022 06:23:02 PM      PATIENT REFERRED TO:  Bhavna Vieira MD  [address] [place] [address]  [phone]    Call in 2 days  As needed, If symptoms worsen    [facility] Emergency Department  [address] [place] [address]  [phone]  Go to   As needed      DISCHARGE MEDICATIONS:  Discharge Medication List as of 3/21/2022  6:23 PM      START taking these medications    Details   naproxen (NAPROSYN) 500 MG tablet Take 1 tablet by mouth 2 times daily (with meals) for 10 doses, Disp-10 tablet, R-0Print             DISCONTINUED MEDICATIONS:  Discharge Medication List as of 3/21/2022  6:23 PM                 (Please note that portions of this note were completed with a voice recognition program.  Efforts were made to edit the dictations but occasionally words are mis-transcribed.)    TAE Garcia CNP (electronically signed)            TAE Garcia CNP  03/22/22 0195

## 2022-04-24 ENCOUNTER — APPOINTMENT (OUTPATIENT)
Dept: GENERAL RADIOLOGY | Age: 77
End: 2022-04-24
Payer: MEDICARE

## 2022-04-24 ENCOUNTER — HOSPITAL ENCOUNTER (EMERGENCY)
Age: 77
Discharge: HOME | End: 2022-04-24
Payer: MEDICARE

## 2022-04-24 VITALS
SYSTOLIC BLOOD PRESSURE: 126 MMHG | RESPIRATION RATE: 16 BRPM | OXYGEN SATURATION: 97 % | TEMPERATURE: 98.1 F | HEART RATE: 78 BPM | DIASTOLIC BLOOD PRESSURE: 91 MMHG

## 2022-04-24 DIAGNOSIS — M54.2 NECK PAIN ON RIGHT SIDE: Primary | ICD-10-CM

## 2022-04-24 DIAGNOSIS — M62.838 TRAPEZIUS MUSCLE SPASM: ICD-10-CM

## 2022-04-24 DIAGNOSIS — M47.812 CERVICAL SPONDYLOSIS: ICD-10-CM

## 2022-04-24 PROCEDURE — 6370000000 HC RX 637 (ALT 250 FOR IP): Performed by: PHYSICIAN ASSISTANT

## 2022-04-24 PROCEDURE — 99283 EMERGENCY DEPT VISIT LOW MDM: CPT

## 2022-04-24 PROCEDURE — 72050 X-RAY EXAM NECK SPINE 4/5VWS: CPT

## 2022-04-24 RX ORDER — CYCLOBENZAPRINE HCL 10 MG
10 TABLET ORAL 3 TIMES DAILY PRN
Qty: 20 TABLET | Refills: 0 | Status: SHIPPED | OUTPATIENT
Start: 2022-04-24

## 2022-04-24 RX ORDER — LIDOCAINE 50 MG/G
1 PATCH TOPICAL DAILY
Qty: 30 PATCH | Refills: 0 | Status: SHIPPED | OUTPATIENT
Start: 2022-04-24

## 2022-04-24 RX ORDER — LIDOCAINE 4 G/G
1 PATCH TOPICAL ONCE
Status: DISCONTINUED | OUTPATIENT
Start: 2022-04-24 | End: 2022-04-24 | Stop reason: HOSPADM

## 2022-04-24 RX ORDER — NAPROXEN 500 MG/1
500 TABLET ORAL 2 TIMES DAILY
Qty: 20 TABLET | Refills: 0 | Status: SHIPPED | OUTPATIENT
Start: 2022-04-24 | End: 2022-05-04

## 2022-04-24 ASSESSMENT — ENCOUNTER SYMPTOMS
WHEEZING: 0
COLOR CHANGE: 0
STRIDOR: 0
ABDOMINAL PAIN: 0
COUGH: 0
SHORTNESS OF BREATH: 0
EYE PAIN: 0
EYE DISCHARGE: 0
EYE ITCHING: 0
BACK PAIN: 0
PHOTOPHOBIA: 0
VOMITING: 0
EYE REDNESS: 0
NAUSEA: 0

## 2022-04-24 ASSESSMENT — PAIN - FUNCTIONAL ASSESSMENT: PAIN_FUNCTIONAL_ASSESSMENT: 0-10

## 2022-04-24 ASSESSMENT — PAIN SCALES - GENERAL: PAINLEVEL_OUTOF10: 10

## 2022-04-24 NOTE — ED PROVIDER NOTES
[address] [facility]        Pt Name: Sally Merritt  MRN: [id number]  [place] 1945  Date of evaluation: 4/24/2022  Provider: Rakesh Arrington PA-C  PCP: Josh Alaniz MD  Note Started: 8:55 AM EDT       TARA. I have evaluated this patient. My supervising physician was available for consultation. CHIEF COMPLAINT       Chief Complaint   Patient presents with    Torticollis     R sided neck pain since thursday, hurts to move       HISTORY OF PRESENT ILLNESS   (Location, Timing/Onset, Context/Setting, Quality, Duration, Modifying Factors, Severity, Associated Signs and Symptoms)  Note limiting factors. Chief Complaint: Right-sided neck pain    Sally Merritt is a 67 y.o. female who presents to the emergency department complaining of right-sided neck pain starting on Thursday. It is progressively worsened and now it hurts to turn her head to the right or tilts to the right because of pain in the right side of her neck. She is holding her head and neck still, as this does relieve some of the pain. Denies any preceding injury, numbness, tingling, weakness or radiation of pain. She rates her pain to be a 10 out of 10 on pain scale. She has not taken any medicine for symptoms since the onset. Denies headache, dizziness or lightheadedness, chest pain, shortness of breath or abdominal pain. Nursing Notes were all reviewed and agreed with or any disagreements were addressed in the HPI. REVIEW OF SYSTEMS    (2-9 systems for level 4, 10 or more for level 5)     Review of Systems   Constitutional: Negative for chills and fever. HENT: Negative. Eyes: Negative for photophobia, pain, discharge, redness, itching and visual disturbance. Respiratory: Negative for cough, shortness of breath, wheezing and stridor. Cardiovascular: Negative for chest pain, palpitations and leg swelling.    Gastrointestinal: Negative for abdominal pain, nausea and vomiting. Musculoskeletal: Positive for neck pain and neck stiffness. Negative for back pain. Skin: Negative for color change, pallor, rash and wound. Neurological: Negative for dizziness, tremors, seizures, syncope, facial asymmetry, speech difficulty, weakness, light-headedness, numbness and headaches. Hematological: Negative for adenopathy. Psychiatric/Behavioral: Negative for confusion. All other systems reviewed and are negative. Positives and Pertinent negatives as per HPI. Except as noted above in the ROS, all other systems were reviewed and negative. PAST MEDICAL HISTORY     Past Medical History:   Diagnosis Date    Arthritis     Hypertension          SURGICAL HISTORY     Past Surgical History:   Procedure Laterality Date    ANKLE FRACTURE SURGERY  2009    ROTATOR CUFF REPAIR Right 13         CURRENTMEDICATIONS       Previous Medications    ACETAMINOPHEN (TYLENOL) 500 MG TABLET    Take 1 tablet by mouth every 6 hours as needed for Pain    CLOTRIMAZOLE-BETAMETHASONE (LOTRISONE) 1-0.05 % CREAM    Apply topically 2 times daily. KETOCONAZOLE (NIZORAL) 2 % SHAMPOO    Apply topically daily as needed. POLYETHYLENE GLYCOL 400 1 % SOLN    Apply 2 drops to eye 3 times daily as needed (Itchy ; dry eyes)    VITAMIN D (ERGOCALCIFEROL) 1.25 MG (48408 UT) CAPS CAPSULE    TAKE 1 CAPSULE BY MOUTH 1 TIME A WEEK         ALLERGIES     Patient has no known allergies. FAMILYHISTORY       Family History   Problem Relation Age of Onset    Cancer Mother         lung    Stroke Father     Cancer Maternal Grandmother     Stroke Maternal Grandfather           SOCIAL HISTORY       Social History     Tobacco Use    Smoking status: Former Smoker     Packs/day: 0.25     Years: 30.00     Pack years: 7.50     Quit date:      Years since quittin.3    Smokeless tobacco: Never Used   Vaping Use    Vaping Use: Never used   Substance Use Topics    Alcohol use:  No  Drug use: No       SCREENINGS    Salvador Coma Scale  Eye Opening: Spontaneous  Best Verbal Response: Oriented  Best Motor Response: Obeys commands  Salvador Coma Scale Score: 15        PHYSICAL EXAM    (up to 7 for level 4, 8 or more for level 5)     ED Triage Vitals [04/24/22 0845]   BP Temp Temp src Pulse Resp SpO2 Height Weight   (!) 126/91 98.1 °F (36.7 °C) -- 78 16 97 % -- --       Physical Exam  Vitals and nursing note reviewed. Constitutional:       Appearance: Normal appearance. She is well-developed. She is not toxic-appearing or diaphoretic. HENT:      Head: Normocephalic and atraumatic. Jaw: There is normal jaw occlusion. Right Ear: Hearing, tympanic membrane, ear canal and external ear normal.      Left Ear: Hearing, tympanic membrane, ear canal and external ear normal.      Nose: Nose normal.      Right Sinus: No maxillary sinus tenderness or frontal sinus tenderness. Left Sinus: No maxillary sinus tenderness or frontal sinus tenderness. Mouth/Throat:      Lips: Pink. Mouth: Mucous membranes are moist. No injury, lacerations, oral lesions or angioedema. Dentition: No dental tenderness or dental abscesses. Tongue: No lesions. Tongue does not deviate from midline. Palate: No mass and lesions. Pharynx: Oropharynx is clear. Uvula midline. No uvula swelling. Tonsils: No tonsillar exudate or tonsillar abscesses. 1+ on the right. 1+ on the left. Eyes:      General: No scleral icterus. Right eye: No discharge. Left eye: No discharge. Extraocular Movements: Extraocular movements intact. Conjunctiva/sclera: Conjunctivae normal.      Pupils: Pupils are equal, round, and reactive to light. Neck:      Thyroid: No thyromegaly or thyroid tenderness. Vascular: No carotid bruit or JVD. Trachea: Trachea and phonation normal.      Meningeal: Brudzinski's sign and Kernig's sign absent.    Cardiovascular:      Rate and Rhythm: not returned as of this dictation. EKG: When ordered, EKG's are interpreted by the Emergency Department Physician in the absence of a cardiologist.  Please see their note for interpretation of EKG. RADIOLOGY:   Non-plain film images such as CT, Ultrasound and MRI are read by the radiologist. Plain radiographic images are visualized and preliminarily interpreted by the ED Provider with the below findings:        Interpretation per the Radiologist below, if available at the time of this note:    XR CERVICAL SPINE (4-5 VIEWS)   Final Result   No acute osseous injury. C4-5 mild degenerative retrolisthesis. Multilevel spondylosis, most developed at C6-7. No results found. PROCEDURES   Unless otherwise noted below, none     Procedures    CRITICAL CARE TIME   n/a    CONSULTS:  None      EMERGENCY DEPARTMENT COURSE and DIFFERENTIAL DIAGNOSIS/MDM:   Vitals:    Vitals:    04/24/22 0845   BP: (!) 126/91   Pulse: 78   Resp: 16   Temp: 98.1 °F (36.7 °C)   SpO2: 97%       Patient was given the following medications:  Medications   lidocaine 4 % external patch 1 patch (1 patch TransDERmal Patch Applied 4/24/22 0855)           This patient presents to the emergency department complaining of right-sided neck pain and stiffness. Differentials include but not limited to torticollis, strain, sprain, arthritis, bursitis, tendinitis, contusion, spasm, DDD, DJD. X-ray shows no acute osseous abnormality. Patient does have degenerative findings such as retrolisthesis and spondylosis.   My suspicion is low for carotid dissection, sinus abscess, acute fracture, acute CVA, ICH, SAH, TIA, meningitis, encephalitis, pseudotumor cerebri, temporal arteritis, sentinel bleed from ruptured aneurysm, hypertensive urgency or emergency, subdural hematoma, epidural hematoma, cerebellar compromise, posterior stroke, Chiari malformation, hydrocephalus, skull or facial fracture, postconcussive syndrome,  ACS, PE, myocarditis, pericarditis, endocarditis, acute pulmonary edema, pleural effusion, pericardial effusion, cardiac tamponade, cardiomyopathy, CHF exacerbation, thoracic aortic dissection, esophageal rupture, other life-threatening arrhythmia, hypertensive urgency or emergency, hemothorax, pulmonary contusion, subcutaneous emphysema, flail chest, pneumo mediastinum, rib fracture, pneumonia, pneumothorax, COVID-19, pertussis, RSV, influenza, ARDS, severe asthma exacerbation, severe COPD exacerbation,acute spine fracture or dislocation, epidural abscess or hematoma, discitis, meningitis, encephalitis, transverse myelitis, cauda quina,  pyelonephritis, perinephric abscess, urosepsis, kidney stone, AAA, dissection, shingles, or other concerning pathology. Patient will be sent home with medicine for symptoms. Advised to follow-up with PCP for recheck and may return to ED per discharge instructions. Referred to [facility]. FINAL IMPRESSION      1. Neck pain on right side    2. Trapezius muscle spasm    3. Cervical spondylosis          DISPOSITION/PLAN   DISPOSITION Decision To Discharge 04/24/2022 09:34:41 AM      PATIENT REFERRED TO:  Virginie Villalobos MD  [address] [facility]  [phone]      follow up in 1-3 days    [facility] Emergency Department  [address] [facility]  [phone]    If symptoms worsen    [facility]  [facility]  [address] [place] [address]          DISCHARGE MEDICATIONS:  New Prescriptions    CYCLOBENZAPRINE (FLEXERIL) 10 MG TABLET    Take 1 tablet by mouth 3 times daily as needed for Muscle spasms    LIDOCAINE (LIDODERM) 5 %    Place 1 patch onto the skin daily 12 hours on, 12 hours off.     NAPROXEN (NAPROSYN) 500 MG TABLET    Take 1 tablet by mouth 2 times daily for 20 doses       DISCONTINUED MEDICATIONS:  Discontinued Medications    NAPROXEN (NAPROSYN) 500 MG TABLET    Take 1 tablet by mouth 2 times daily (with meals) for 10 doses              (Please note that portions of this note were completed with a voice recognition program.  Efforts were made to edit the dictations but occasionally words are mis-transcribed.)    Elda Womack PA-C (electronically signed)           Elda Womack PA-C  04/24/22 9325

## 2022-04-24 NOTE — ED NOTES
Pt discharged home, verbalized discharge instructions. Ambulated independently to exit without difficulty.        Wilmar Romero, RN  04/24/22 4503

## 2022-05-13 ENCOUNTER — TELEPHONE (OUTPATIENT)
Dept: PRIMARY CARE CLINIC | Age: 77
End: 2022-05-13

## 2022-05-13 NOTE — TELEPHONE ENCOUNTER
Attempted to call PT regarding below message. Mailbox full. Unable to leave message. ----- Message from Keith Durand sent at 5/13/2022 11:12 AM EDT -----  Subject: Appointment Request    Reason for Call: Routine Existing Condition Follow Up    QUESTIONS  Type of Appointment? Established Patient  Reason for appointment request? No appointments available during search  Additional Information for Provider? Patient states she was seen by Dr. Wes Alvarez for a skin issue on her leg and was prescribed medication for it and   that medication is not working, she would like to get it looked at again   and would like in person visit. Dr Wes Alvarez not available until 6/16 - pt would   like to be seen before then, i am unable to see the other provider   schedules please advise and call pt  ---------------------------------------------------------------------------  --------------  [address]  What is the best way for the office to contact you? OK to leave message on   voicemail  Preferred Call Back Phone Number? [phone]  ---------------------------------------------------------------------------  --------------  SCRIPT ANSWERS  Relationship to Patient? Self  Is this follow up request related to routine Diabetes Management? No  Have you been diagnosed with, awaiting test results for, or told that you   are suspected of having COVID-19 (Coronavirus)? (If patient has tested   negative or was tested as a requirement for work, school, or travel and   not based on symptoms, answer no)? No  Within the past 10 days have you developed any of the following symptoms   (answer no if symptoms have been present longer than 10 days or began   more than 10 days ago)? Fever or Chills, Cough, Shortness of breath or   difficulty breathing, Loss of taste or smell, Sore throat, Nasal   congestion, Sneezing or runny nose, Fatigue or generalized body aches   (answer no if pain is specific to a body part e.g. back pain), Diarrhea,   Headache? No  Have you had close contact with someone with COVID-19 in the last 7 days? No  (Service Expert - click yes below to proceed with nano[employer]TE As Usual   Scheduling)?  Yes

## 2022-05-18 ENCOUNTER — OFFICE VISIT (OUTPATIENT)
Dept: PRIMARY CARE CLINIC | Age: 77
End: 2022-05-18
Payer: MEDICARE

## 2022-05-18 VITALS
BODY MASS INDEX: 27.99 KG/M2 | WEIGHT: 168 LBS | DIASTOLIC BLOOD PRESSURE: 106 MMHG | SYSTOLIC BLOOD PRESSURE: 166 MMHG | TEMPERATURE: 97.7 F | OXYGEN SATURATION: 98 % | HEART RATE: 63 BPM | HEIGHT: 65 IN

## 2022-05-18 DIAGNOSIS — I10 ESSENTIAL HYPERTENSION: ICD-10-CM

## 2022-05-18 DIAGNOSIS — L30.9 CHRONIC DERMATITIS: Primary | ICD-10-CM

## 2022-05-18 PROCEDURE — 1123F ACP DISCUSS/DSCN MKR DOCD: CPT | Performed by: NURSE PRACTITIONER

## 2022-05-18 PROCEDURE — 99213 OFFICE O/P EST LOW 20 MIN: CPT | Performed by: NURSE PRACTITIONER

## 2022-05-18 RX ORDER — CLOTRIMAZOLE AND BETAMETHASONE DIPROPIONATE 10; .64 MG/G; MG/G
CREAM TOPICAL
Qty: 45 G | Refills: 3 | Status: SHIPPED | OUTPATIENT
Start: 2022-05-18

## 2022-05-18 RX ORDER — HYDROCHLOROTHIAZIDE 25 MG/1
25 TABLET ORAL DAILY
Qty: 90 TABLET | Refills: 1 | Status: SHIPPED | OUTPATIENT
Start: 2022-05-18

## 2022-05-18 NOTE — PATIENT INSTRUCTIONS
Schedule appointment with Dermatology  Apply ointment as prescribed  Start taking HCTZ  Schedule with PCP

## 2022-05-18 NOTE — PROGRESS NOTES
Adam Alves (:  1945) is a 67 y.o. female,Established patient, here for evaluation of the following chief complaint(s):  Rash (on legs and hands for years, very itchy)         ASSESSMENT/PLAN:  1. Chronic dermatitis  -     clotrimazole-betamethasone (LOTRISONE) 1-0.05 % cream; Apply topically 2 times daily. , Disp-45 g, R-3, Normal  -     Mariam Rock MD, Dermatology, [facility]  2. Essential hypertension  -     hydroCHLOROthiazide (HYDRODIURIL) 25 MG tablet; Take 1 tablet by mouth daily, Disp-90 tablet, R-1**Patient requests 90 days supply**Normal      No follow-ups on file. Subjective   SUBJECTIVE/OBJECTIVE:  Hypertension  This is a chronic problem. The current episode started more than 1 year ago. The problem is uncontrolled. Pertinent negatives include no anxiety, chest pain, headaches, neck pain, palpitations, PND, shortness of breath or sweats. Risk factors for coronary artery disease include family history. Past treatments include diuretics and lifestyle changes. The current treatment provides moderate improvement. Compliance problems include diet and exercise. Review of Systems   Constitutional: Negative for activity change and fever. HENT: Negative for congestion. Eyes: Negative for visual disturbance. Respiratory: Negative for chest tightness and shortness of breath. Cardiovascular: Negative for chest pain, palpitations, leg swelling and PND. Gastrointestinal: Negative for abdominal pain, constipation and diarrhea. Endocrine: Negative for polyuria. Genitourinary: Negative for dysuria. Musculoskeletal: Negative for arthralgias, myalgias and neck pain. Skin: Negative for rash. Dry itchy discoloration of lower legs, anteriorly for years   Neurological: Negative for dizziness, light-headedness and headaches. Psychiatric/Behavioral: Negative for agitation, decreased concentration and sleep disturbance.  The patient is not

## 2022-05-24 ASSESSMENT — ENCOUNTER SYMPTOMS
CHEST TIGHTNESS: 0
ABDOMINAL PAIN: 0
SHORTNESS OF BREATH: 0
CONSTIPATION: 0
DIARRHEA: 0

## 2022-05-24 ASSESSMENT — VISUAL ACUITY: OU: 1

## 2022-07-25 ENCOUNTER — HOSPITAL ENCOUNTER (EMERGENCY)
Age: 77
Discharge: HOME | End: 2022-07-25
Payer: MEDICARE

## 2022-07-25 VITALS
DIASTOLIC BLOOD PRESSURE: 87 MMHG | HEART RATE: 68 BPM | SYSTOLIC BLOOD PRESSURE: 142 MMHG | RESPIRATION RATE: 16 BRPM | TEMPERATURE: 97.7 F | OXYGEN SATURATION: 97 %

## 2022-07-25 DIAGNOSIS — H61.23 BILATERAL IMPACTED CERUMEN: Primary | ICD-10-CM

## 2022-07-25 PROCEDURE — 6370000000 HC RX 637 (ALT 250 FOR IP): Performed by: PHYSICIAN ASSISTANT

## 2022-07-25 PROCEDURE — 69209 REMOVE IMPACTED EAR WAX UNI: CPT

## 2022-07-25 PROCEDURE — 99283 EMERGENCY DEPT VISIT LOW MDM: CPT

## 2022-07-25 RX ADMIN — Medication 5 DROP: at 19:41

## 2022-07-25 NOTE — ED PROVIDER NOTES
[address] [facility]        Pt Name: Ashley Griffin  MRN: [id number]  [place] 1945  Date of evaluation: 7/25/2022  Provider: Mariangel Tripathi PA-C  PCP: Avel Rivers MD  Note Started: 7:16 PM EDT       TARA. I have evaluated this patient. My supervising physician was available for consultation. CHIEF COMPLAINT       Chief Complaint   Patient presents with    Hearing Problem     Patient in with complaints of loss of hearing in the left ear. She states this began 5 days ago. Denies pain        HISTORY OF PRESENT ILLNESS   (Location, Timing/Onset, Context/Setting, Quality, Duration, Modifying Factors, Severity, Associated Signs and Symptoms)  Note limiting factors. Chief Complaint: Hearing loss    Ashley Griffin is a 67 y.o. female who presents to the emergency department with a chief complaint of a least 5-day history of hearing loss of her left ear. She states this is happened in the past and she had a cerumen impaction. She does not use Q-tips she denies injury, trauma, drainage, sinus congestion, nausea, vomiting, fevers or any associated pain or any other symptoms. Nursing Notes were all reviewed and agreed with or any disagreements were addressed in the HPI. REVIEW OF SYSTEMS    (2-9 systems for level 4, 10 or more for level 5)     Review of Systems    Positives and Pertinent negatives as per HPI. Except as noted above in the ROS, all other systems were reviewed and negative.        PAST MEDICAL HISTORY     Past Medical History:   Diagnosis Date    Arthritis     Hypertension          SURGICAL HISTORY     Past Surgical History:   Procedure Laterality Date    ANKLE FRACTURE SURGERY  2009    ROTATOR CUFF REPAIR Right 8/2/13         CURRENTMEDICATIONS       Discharge Medication List as of 7/25/2022  8:41 PM        CONTINUE these medications which have NOT CHANGED    Details   hydroCHLOROthiazide (HYDRODIURIL) 25 MG tablet Take 1 tablet by mouth daily, Disp-90 tablet, R-1**Patient requests 90 days supply**Normal      clotrimazole-betamethasone (LOTRISONE) 1-0.05 % cream Apply topically 2 times daily. , Disp-45 g, R-3, Normal      naproxen (NAPROSYN) 500 MG tablet Take 1 tablet by mouth 2 times daily for 20 doses, Disp-20 tablet, R-0Normal      cyclobenzaprine (FLEXERIL) 10 MG tablet Take 1 tablet by mouth 3 times daily as needed for Muscle spasms, Disp-20 tablet, R-0Normal      lidocaine (LIDODERM) 5 % Place 1 patch onto the skin daily 12 hours on, 12 hours off., Disp-30 patch, R-0Normal      vitamin D (ERGOCALCIFEROL) 1.25 MG (42182 UT) CAPS capsule TAKE 1 CAPSULE BY MOUTH 1 TIME A WEEK, Disp-13 capsule, R-5**Patient requests 90 days supply**Normal      Polyethylene Glycol 400 1 % SOLN Apply 2 drops to eye 3 times daily as needed (Itchy ; dry eyes), Disp-1 Bottle, R-2Normal      ketoconazole (NIZORAL) 2 % shampoo Apply topically daily as needed. , Disp-2 Bottle, R-5, Normal               ALLERGIES     Patient has no known allergies.     FAMILYHISTORY       Family History   Problem Relation Age of Onset    Cancer Mother         lung    Stroke Father     Cancer Maternal Grandmother     Stroke Maternal Grandfather           SOCIAL HISTORY       Social History     Tobacco Use    Smoking status: Former     Packs/day: 0.25     Years: 30.00     Pack years: 7.50     Types: Cigarettes     Quit date:      Years since quittin.5    Smokeless tobacco: Never   Vaping Use    Vaping Use: Never used   Substance Use Topics    Alcohol use: No    Drug use: No       SCREENINGS    [place] Coma Scale  Eye Opening: Spontaneous  Best Verbal Response: Oriented  Best Motor Response: Obeys commands  [place] Coma Scale Score: 15        PHYSICAL EXAM    (up to 7 for level 4, 8 or more for level 5)     ED Triage Vitals [22 1849]   BP Temp Temp src Heart Rate Resp SpO2 Height Weight   (!) 142/87 97.7 °F (36.5 °C) -- 68 16 97 % -- -- Physical Exam  Vitals and nursing note reviewed. Constitutional:       Appearance: She is well-developed. She is not diaphoretic. HENT:      Head: Atraumatic. Right Ear: There is impacted cerumen. Left Ear: There is impacted cerumen. Nose: Nose normal.   Eyes:      General:         Right eye: No discharge. Left eye: No discharge. Musculoskeletal:      Cervical back: Normal range of motion. Skin:     General: Skin is warm and dry. Findings: No erythema or rash. Neurological:      Mental Status: She is alert and oriented to person, place, and time. Cranial Nerves: No cranial nerve deficit. Psychiatric:         Behavior: Behavior normal.       DIAGNOSTIC RESULTS   LABS:    Labs Reviewed - No data to display    When ordered only abnormal lab results are displayed. All other labs were within normal range or not returned as of this dictation. EKG: When ordered, EKG's are interpreted by the Emergency Department Physician in the absence of a cardiologist.  Please see their note for interpretation of EKG. RADIOLOGY:   Non-plain film images such as CT, Ultrasound and MRI are read by the radiologist. Plain radiographic images are visualized and preliminarily interpreted by the ED Provider with the below findings:        Interpretation per the Radiologist below, if available at the time of this note:    No orders to display     No results found.         PROCEDURES   Unless otherwise noted below, none     Ear Wax Removal    Date/Time: 7/25/2022 10:48 PM  Performed by: Jose Manuel Murrieta PA-C  Authorized by: Jose Manuel Murrieta PA-C     Consent:     Consent obtained:  Verbal    Consent given by:  Patient    Risks discussed:  Bleeding, infection, pain, TM perforation, incomplete removal and dizziness  Universal protocol:     Patient identity confirmed:  Verbally with patient  Procedure details:     Location:  L ear and R ear    Procedure type: irrigation      Procedure outcomes: cerumen removed    Post-procedure details: Inspection:  No bleeding    Hearing quality:  Normal    Procedure completion:  Tolerated    CRITICAL CARE TIME       CONSULTS:  None      EMERGENCY DEPARTMENT COURSE and DIFFERENTIAL DIAGNOSIS/MDM:   Vitals:    Vitals:    07/25/22 1849   BP: (!) 142/87   Pulse: 68   Resp: 16   Temp: 97.7 °F (36.5 °C)   SpO2: 97%       Patient was given the following medications:  Medications   carbamide peroxide (DEBROX) 6.5 % otic solution 5 drop (5 drops Both Ears Given 7/25/22 1941)         Is this patient to be included in the SEP-1 Core Measure due to severe sepsis or septic shock? No   Exclusion criteria - the patient is NOT to be included for SEP-1 Core Measure due to: Infection is not suspected    Patient present with decreased hearing on the left ear. She had bilateral cerumen impaction. A large amount of cerumen was removed out of bilateral ears worse on the left. She is now able to hear better. Nothing to suggest otitis externa, mastoiditis or infectious etiology. She was educated on use of Debrox over-the-counter. We will follow-up as an outpatient return here for any worsening of symptoms or problems at home. FINAL IMPRESSION      1.  Bilateral impacted cerumen          DISPOSITION/PLAN   DISPOSITION Decision To Discharge 07/25/2022 08:30:01 PM      PATIENT REFERRED TO:  Elizabeth Aly MD  [place] [address]  [address]  [phone]    Schedule an appointment as soon as possible for a visit in 3 days  For re-check    [facility] Emergency Department  [address] [facility]  [phone]    As needed    DISCHARGE MEDICATIONS:  Discharge Medication List as of 7/25/2022  8:41 PM          DISCONTINUED MEDICATIONS:  Discharge Medication List as of 7/25/2022  8:41 PM                 (Please note that portions of this note were completed with a voice recognition program.  Efforts were made to edit the dictations but occasionally words are mis-transcribed.)    Matthew Mcnamara PA-C (electronically signed)            Matthew Mcnamara PA-C  07/25/22 6910

## 2022-07-26 NOTE — ED NOTES
Discharge instructions and prescriptions reviewed with pt. Pt allowed opportunity to ask questions and verbalized understanding.       Janneth Esquivel RN  07/25/22 2043

## 2022-12-22 ENCOUNTER — TELEMEDICINE (OUTPATIENT)
Dept: PRIMARY CARE CLINIC | Age: 77
End: 2022-12-22

## 2022-12-22 DIAGNOSIS — I10 ESSENTIAL HYPERTENSION: ICD-10-CM

## 2022-12-22 DIAGNOSIS — R22.0 SWOLLEN GUMS: ICD-10-CM

## 2022-12-22 DIAGNOSIS — Z23 HIGH PRIORITY FOR COVID-19 VACCINATION: Primary | ICD-10-CM

## 2022-12-22 RX ORDER — HYDROCHLOROTHIAZIDE 25 MG/1
25 TABLET ORAL DAILY
Qty: 90 TABLET | Refills: 1 | Status: SHIPPED | OUTPATIENT
Start: 2022-12-22

## 2022-12-22 RX ORDER — ACETAMINOPHEN 500 MG
500 TABLET ORAL EVERY 6 HOURS PRN
Qty: 120 TABLET | Refills: 1 | Status: SHIPPED | OUTPATIENT
Start: 2022-12-22

## 2022-12-22 ASSESSMENT — ENCOUNTER SYMPTOMS
WHEEZING: 0
CHEST TIGHTNESS: 0

## 2022-12-22 NOTE — PROGRESS NOTES
Subjective:      Patient ID: Mena Huynh is a 65 y.o. female. 12/22/2022 Patient presents with:  Oral Swelling  had teeth pulled 2 wks ago . Gums sore . Dentist gave her Ibuprofen and Vicodin . No bleeding   Hypertension:     No falls . No depression               Last seen   7/21/2021 Patient presents with:  Pre-op Exam: cataract surgery 7/27/21- left eye, Dr Jermaine Chester    Having freq of urination . Believes it is from hctz . Admits she does not take Hctz regularly because of frequency . Has not taken it this am     No known h/o anaesthesia problems or bleeding disorders     Insomnia still an issue        10/14/2020 Patient presents with: Insomnia trazodone does not help  Rash: hands . Does not wear gloves when washing dishes or cleaning! Check-Up    No falls     No depression     Had Covid Infection 6/20 . Stayed Home . Feeling well now           Last seen  11/7/2019 Patient presents with:  Medicare AWV  Feeling fine except for rt knee pain . To get shots from Ortho              ast seen   4/10/19 Patient presents with:  Leg Pain: whole rt leg hurts  Hypertension: no issues  Tingling: never went forr eMG  Rash        Seen in ER [facility] 4/6/19  For same        Last seen  3/5/19 Patient presents with:  Check-Up: tingling left arm continues . No numbness . no pain                 Works regularly ; [employer] keeping / cleaning         \"          Review of Systems   Constitutional:         Pneumonia Vac 2/16 ; 12/16     Flu vac  10/21 at work     Tdap 11/19    shingrex / script / 10/20    Covid Vac 3/21   HENT:          Swollen gums    Eyes:         Wears glasses ; Eye exam  7/21  Due for cataract surgery   Respiratory:  Negative for chest tightness and wheezing. Does not smoke ( stopped 1998 ) .      No Etoh     No asthma    Cardiovascular:         HTN ; but no CAD     FH of HTN ; Son had MI     Fairly active    Gastrointestinal:         Colonoscopy 2013 at [place]   No FH of Ca colon    Endocrine: FH of diabetes    Genitourinary:         Natural Menopause   Last pelvic/ Pap 2013    Mammogram  4/22   Musculoskeletal:         DEXA 4/15 . Nl      Objective:   Physical Exam  Constitutional:       General: She is not in acute distress. Pulmonary:      Effort: Pulmonary effort is normal.   Musculoskeletal:      Comments:        Neurological:      Mental Status: She is alert and oriented to person, place, and time. Psychiatric:         Mood and Affect: Mood normal.         Behavior: Behavior normal.       Assessment:    Lilian Gonzalez is a 65 y.o. female evaluated via  [facility]  on 12/22/2022 for Oral Swelling and Hypertension (/)  . Documentation:  I communicated with the patient and/or health care decision maker about this   Details of this discussion including any medical advice provided:as noted     Total Time: minutes: 11-20 minutes    Lilian Gonzalez was evaluated through a synchronous (real-time) audio encounter. Patient identification was verified at the start of the visit. She (or guardian if applicable) is aware that this is a billable service, which includes applicable co-pays. This visit was conducted with the patient's (and/or legal guardian's) verbal consent. She has not had a related appointment within my department in the past 7 days or scheduled within the next 24 hours. The patient was located at Home: [address] [place] [address] Drive [address]. The provider was located at [facility] (Appt Dept): [address] [place] [address],  [address] [facility]. Note: not billable if this call serves to triage the patient into an appointment for the relevant concern    MD Sole Porras was seen today for oral swelling and hypertension. Diagnoses and all orders for this visit:    High priority for COVID-19 vaccination  ne Bival vac at pharm    Essential hypertension  get labs done then f/u in office for exam   -     hydroCHLOROthiazide (HYDRODIURIL) 25 MG tablet;  Take 1 tablet by mouth daily  -     CBC with Auto Differential; Future  -     Comprehensive Metabolic Panel; Future  -     Hemoglobin A1C; Future  -     Lipid Panel; Future  -     TSH; Future  -     Urinalysis; Future  -     Vitamin D 25 Hydroxy; Future    Swollen gums  DC   ibuprofen . Ok to take vicodin for severe pain only   -     benzocaine (ORAJEL) 10 % mucosal gel; Take by mouth as needed. -     acetaminophen (TYLENOL) 500 MG tablet; Take 1 tablet by mouth every 6 hours as needed for Pain      -        Plan:      Self Management Goals    Know which medication is for what condition:   Know side effects of medications, and discuss with doctor   Discuss side effects and instructions on new medications. Barriers to medication compliance addressed. All patient questions answered. Pt voiced understanding.   Know correct dose/frequency of medications  Take medications at the same time each day  Stay current on medication refills  If taking OTC's check with MD/pharmacy first about interactions    Systolic BP < or equal to 825  Diastolic BP < or equal to 85    Current Flu and Pneumonia Vax  Set targets for weight loss 4 lbs per month  Exercise 3-5 times per week  Keep check of weight

## 2023-06-21 ENCOUNTER — OFFICE VISIT (OUTPATIENT)
Dept: PRIMARY CARE CLINIC | Age: 78
End: 2023-06-21

## 2023-06-21 VITALS
DIASTOLIC BLOOD PRESSURE: 92 MMHG | HEART RATE: 72 BPM | WEIGHT: 170 LBS | SYSTOLIC BLOOD PRESSURE: 136 MMHG | BODY MASS INDEX: 28.29 KG/M2

## 2023-06-21 DIAGNOSIS — L30.9 DERMATITIS: Primary | ICD-10-CM

## 2023-06-21 DIAGNOSIS — F51.01 PRIMARY INSOMNIA: ICD-10-CM

## 2023-06-21 DIAGNOSIS — I10 ESSENTIAL HYPERTENSION: ICD-10-CM

## 2023-06-21 RX ORDER — CLOBETASOL PROPIONATE 0.5 MG/G
OINTMENT TOPICAL
Qty: 45 G | Refills: 3 | Status: SHIPPED | OUTPATIENT
Start: 2023-06-21

## 2023-06-21 RX ORDER — HYDROCHLOROTHIAZIDE 25 MG/1
25 TABLET ORAL DAILY
Qty: 90 TABLET | Refills: 1 | Status: SHIPPED | OUTPATIENT
Start: 2023-06-21

## 2023-06-21 ASSESSMENT — ENCOUNTER SYMPTOMS
DIARRHEA: 0
CONSTIPATION: 0
SHORTNESS OF BREATH: 0
ABDOMINAL PAIN: 0
BLOOD IN STOOL: 0

## 2023-06-21 ASSESSMENT — PATIENT HEALTH QUESTIONNAIRE - PHQ9
1. LITTLE INTEREST OR PLEASURE IN DOING THINGS: 0
SUM OF ALL RESPONSES TO PHQ9 QUESTIONS 1 & 2: 0
2. FEELING DOWN, DEPRESSED OR HOPELESS: 0
SUM OF ALL RESPONSES TO PHQ QUESTIONS 1-9: 0

## 2023-06-21 NOTE — PROGRESS NOTES
2023     Randal Lawler (:  1945) is a 65 y.o. female, here for evaluation of the following medical concerns:    Rash  Pertinent negatives include no diarrhea or shortness of breath. Patient presented to the office complaining of recurring lesion distal third of both legs. The area is size of a quarter with discoloration somewhat pruritic. Rash is chronic recurring problem and has tried Lotrisone cream in the past with minimal improvement. She is also here to request for refill of the hydrochlorothiazide for her blood pressure. She also complain of the insomnia, has tried Restoril and trazodone in the past.  She denies being under stress more than usual.  She denies headache nausea vomiting. Denies chest pain or shortness of breath. Denies bowel or urinary disturbance    Review of Systems   Constitutional:  Negative for activity change and appetite change. Eyes:  Negative for visual disturbance. Respiratory:  Negative for shortness of breath. Cardiovascular:  Negative for chest pain and leg swelling. Gastrointestinal:  Negative for abdominal pain, blood in stool, constipation and diarrhea. Genitourinary:  Negative for difficulty urinating, frequency, hematuria, menstrual problem and urgency. Skin:  Positive for rash. Neurological:  Negative for dizziness and syncope. Psychiatric/Behavioral:  Negative for behavioral problems. Prior to Visit Medications    Medication Sig Taking? Authorizing Provider   hydroCHLOROthiazide (HYDRODIURIL) 25 MG tablet Take 1 tablet by mouth daily Yes Agnes Ramirez MD   clobetasol (TEMOVATE) 0.05 % ointment Apply topically 2 times daily. Yes Agnes Ramirez MD   benzocaine (ORAJEL) 10 % mucosal gel Take by mouth as needed.   Neal Woodruff MD   acetaminophen (TYLENOL) 500 MG tablet Take 1 tablet by mouth every 6 hours as needed for Pain  Neal Woodruff MD        Social History     Tobacco Use    Smoking status: Former

## 2023-12-03 ENCOUNTER — APPOINTMENT (OUTPATIENT)
Dept: GENERAL RADIOLOGY | Age: 78
End: 2023-12-03
Payer: MEDICARE

## 2023-12-03 ENCOUNTER — HOSPITAL ENCOUNTER (EMERGENCY)
Age: 78
Discharge: HOME | End: 2023-12-03
Payer: MEDICARE

## 2023-12-03 VITALS
DIASTOLIC BLOOD PRESSURE: 78 MMHG | HEART RATE: 80 BPM | HEIGHT: 65 IN | TEMPERATURE: 99 F | WEIGHT: 171 LBS | SYSTOLIC BLOOD PRESSURE: 170 MMHG | RESPIRATION RATE: 20 BRPM | BODY MASS INDEX: 28.49 KG/M2 | OXYGEN SATURATION: 97 %

## 2023-12-03 DIAGNOSIS — U07.1 COVID-19: Primary | ICD-10-CM

## 2023-12-03 LAB
FLUAV RNA RESP QL NAA+PROBE: NOT DETECTED
FLUBV RNA RESP QL NAA+PROBE: NOT DETECTED
SARS-COV-2 RNA RESP QL NAA+PROBE: DETECTED

## 2023-12-03 PROCEDURE — 99284 EMERGENCY DEPT VISIT MOD MDM: CPT

## 2023-12-03 PROCEDURE — 71045 X-RAY EXAM CHEST 1 VIEW: CPT

## 2023-12-03 PROCEDURE — 87636 SARSCOV2 & INF A&B AMP PRB: CPT

## 2023-12-03 PROCEDURE — 6370000000 HC RX 637 (ALT 250 FOR IP): Performed by: PHYSICIAN ASSISTANT

## 2023-12-03 RX ORDER — ACETAMINOPHEN 500 MG
1000 TABLET ORAL ONCE
Status: COMPLETED | OUTPATIENT
Start: 2023-12-03 | End: 2023-12-03

## 2023-12-03 RX ORDER — BENZONATATE 100 MG/1
100 CAPSULE ORAL 2 TIMES DAILY PRN
Qty: 20 CAPSULE | Refills: 0 | Status: SHIPPED | OUTPATIENT
Start: 2023-12-03 | End: 2023-12-10

## 2023-12-03 RX ADMIN — ACETAMINOPHEN 1000 MG: 500 TABLET ORAL at 13:22

## 2023-12-03 ASSESSMENT — ENCOUNTER SYMPTOMS
NAUSEA: 0
TROUBLE SWALLOWING: 0
DIARRHEA: 0
SINUS PAIN: 0
RHINORRHEA: 0
WHEEZING: 0
VOMITING: 0
COUGH: 1
SHORTNESS OF BREATH: 0
ABDOMINAL PAIN: 0
VOICE CHANGE: 0
CHEST TIGHTNESS: 0
SINUS PRESSURE: 0

## 2023-12-03 ASSESSMENT — PAIN - FUNCTIONAL ASSESSMENT: PAIN_FUNCTIONAL_ASSESSMENT: NONE - DENIES PAIN

## 2023-12-03 ASSESSMENT — PAIN SCALES - GENERAL: PAINLEVEL_OUTOF10: 3

## 2023-12-03 NOTE — ED PROVIDER NOTES
MEDICATIONS:  Discharge Medication List as of 12/3/2023  2:46 PM        START taking these medications    Details   benzonatate (TESSALON) 100 MG capsule Take 1 capsule by mouth 2 times daily as needed for Cough, Disp-20 capsule, R-0Normal             DISCONTINUED MEDICATIONS:  Discharge Medication List as of 12/3/2023  2:46 PM                 (Please note that portions of this note were completed with a voice recognition program.  Efforts were made to edit the dictations but occasionally words are mis-transcribed.)    Leigh Diaz PA-C (electronically signed)           Leigh Diaz PA-C  12/03/23 6201

## 2023-12-06 ENCOUNTER — TELEPHONE (OUTPATIENT)
Dept: PRIMARY CARE CLINIC | Age: 78
End: 2023-12-06

## 2023-12-06 NOTE — TELEPHONE ENCOUNTER
----- Message from Cherry Yola sent at 12/6/2023  1:32 PM EST -----  Subject: Message to Provider    QUESTIONS  Information for Provider? Meuhl Titus called stating that she tested   positive for Covid Frank 12/3/23 and needs to return to work Monday 12/11/23. She is unsure if she should be getting another Covid test or not   before then. She is still experiencing a cough. Please f/u to advise as   soon as possible.   ---------------------------------------------------------------------------  --------------  Ofelia Lee INFO  [phone]; OK to leave message on voicemail  ---------------------------------------------------------------------------  --------------  SCRIPT ANSWERS  Relationship to Patient?  Self

## 2024-01-02 DIAGNOSIS — I10 ESSENTIAL HYPERTENSION: ICD-10-CM

## 2024-01-03 RX ORDER — HYDROCHLOROTHIAZIDE 25 MG/1
25 TABLET ORAL DAILY
Qty: 90 TABLET | Refills: 1 | Status: SHIPPED | OUTPATIENT
Start: 2024-01-03

## 2024-01-03 NOTE — TELEPHONE ENCOUNTER
Medication:   Requested Prescriptions     Pending Prescriptions Disp Refills    hydroCHLOROthiazide (HYDRODIURIL) 25 MG tablet [Pharmacy Med Name: HYDROCHLOROTHIAZIDE 25MG TABLETS] 90 tablet 1     Sig: TAKE 1 TABLET BY MOUTH DAILY     Last Filled:  # 90 with 1 refill on 6/21/23    Last appt: 6/21/2023   Next appt: Visit date not found    Last OARRS:        No data to display

## 2024-01-12 ENCOUNTER — APPOINTMENT (OUTPATIENT)
Dept: CT IMAGING | Age: 79
End: 2024-01-12
Payer: MEDICARE

## 2024-01-12 ENCOUNTER — HOSPITAL ENCOUNTER (EMERGENCY)
Age: 79
Discharge: HOME | End: 2024-01-12
Attending: EMERGENCY MEDICINE
Payer: MEDICARE

## 2024-01-12 VITALS
SYSTOLIC BLOOD PRESSURE: 159 MMHG | BODY MASS INDEX: 28.39 KG/M2 | OXYGEN SATURATION: 97 % | TEMPERATURE: 98.5 F | WEIGHT: 170.6 LBS | DIASTOLIC BLOOD PRESSURE: 76 MMHG | HEART RATE: 55 BPM | RESPIRATION RATE: 18 BRPM

## 2024-01-12 DIAGNOSIS — N39.0 ACUTE LOWER UTI: ICD-10-CM

## 2024-01-12 DIAGNOSIS — R10.9 FLANK PAIN: Primary | ICD-10-CM

## 2024-01-12 LAB
ALBUMIN SERPL-MCNC: 4.2 G/DL (ref 3.4–5)
ALBUMIN/GLOB SERPL: 1.2 {RATIO} (ref 1.1–2.2)
ALP SERPL-CCNC: 93 U/L (ref 40–129)
ALT SERPL-CCNC: 11 U/L (ref 10–40)
ANION GAP SERPL CALCULATED.3IONS-SCNC: 12 MMOL/L (ref 3–16)
AST SERPL-CCNC: 16 U/L (ref 15–37)
BACTERIA URNS QL MICRO: ABNORMAL /HPF
BASOPHILS # BLD: 0.1 K/UL (ref 0–0.2)
BASOPHILS NFR BLD: 1.1 %
BILIRUB SERPL-MCNC: 0.3 MG/DL (ref 0–1)
BILIRUB UR QL STRIP.AUTO: NEGATIVE
BUN SERPL-MCNC: 18 MG/DL (ref 7–20)
CALCIUM SERPL-MCNC: 10 MG/DL (ref 8.3–10.6)
CHLORIDE SERPL-SCNC: 106 MMOL/L (ref 99–110)
CLARITY UR: CLEAR
CO2 SERPL-SCNC: 25 MMOL/L (ref 21–32)
COLOR UR: YELLOW
CREAT SERPL-MCNC: 0.8 MG/DL (ref 0.6–1.2)
DEPRECATED RDW RBC AUTO: 14.6 % (ref 12.4–15.4)
EOSINOPHIL # BLD: 0.1 K/UL (ref 0–0.6)
EOSINOPHIL NFR BLD: 1.2 %
EPI CELLS #/AREA URNS AUTO: 5 /HPF (ref 0–5)
GFR SERPLBLD CREATININE-BSD FMLA CKD-EPI: >60 ML/MIN/{1.73_M2}
GLUCOSE SERPL-MCNC: 81 MG/DL (ref 70–99)
GLUCOSE UR STRIP.AUTO-MCNC: NEGATIVE MG/DL
HCT VFR BLD AUTO: 41.5 % (ref 36–48)
HGB BLD-MCNC: 13.9 G/DL (ref 12–16)
HGB UR QL STRIP.AUTO: NEGATIVE
HYALINE CASTS #/AREA URNS AUTO: 0 /LPF (ref 0–8)
KETONES UR STRIP.AUTO-MCNC: NEGATIVE MG/DL
LEUKOCYTE ESTERASE UR QL STRIP.AUTO: ABNORMAL
LYMPHOCYTES # BLD: 2.3 K/UL (ref 1–5.1)
LYMPHOCYTES NFR BLD: 29.5 %
MCH RBC QN AUTO: 29.1 PG (ref 26–34)
MCHC RBC AUTO-ENTMCNC: 33.6 G/DL (ref 31–36)
MCV RBC AUTO: 86.6 FL (ref 80–100)
MONOCYTES # BLD: 0.5 K/UL (ref 0–1.3)
MONOCYTES NFR BLD: 6.3 %
NEUTROPHILS # BLD: 4.8 K/UL (ref 1.7–7.7)
NEUTROPHILS NFR BLD: 61.9 %
NITRITE UR QL STRIP.AUTO: NEGATIVE
PH UR STRIP.AUTO: 5 [PH] (ref 5–8)
PLATELET # BLD AUTO: 208 K/UL (ref 135–450)
PMV BLD AUTO: 9.2 FL (ref 5–10.5)
POTASSIUM SERPL-SCNC: 4.2 MMOL/L (ref 3.5–5.1)
PROT SERPL-MCNC: 7.6 G/DL (ref 6.4–8.2)
PROT UR STRIP.AUTO-MCNC: NEGATIVE MG/DL
RBC # BLD AUTO: 4.79 M/UL (ref 4–5.2)
RBC CLUMPS #/AREA URNS AUTO: 1 /HPF (ref 0–4)
SODIUM SERPL-SCNC: 143 MMOL/L (ref 136–145)
SP GR UR STRIP.AUTO: 1.02 (ref 1–1.03)
UA COMPLETE W REFLEX CULTURE PNL UR: ABNORMAL
UA DIPSTICK W REFLEX MICRO PNL UR: YES
URN SPEC COLLECT METH UR: ABNORMAL
UROBILINOGEN UR STRIP-ACNC: 1 E.U./DL
WBC # BLD AUTO: 7.8 K/UL (ref 4–11)
WBC #/AREA URNS AUTO: 6 /HPF (ref 0–5)

## 2024-01-12 PROCEDURE — 81001 URINALYSIS AUTO W/SCOPE: CPT

## 2024-01-12 PROCEDURE — 74177 CT ABD & PELVIS W/CONTRAST: CPT

## 2024-01-12 PROCEDURE — 6370000000 HC RX 637 (ALT 250 FOR IP): Performed by: EMERGENCY MEDICINE

## 2024-01-12 PROCEDURE — 99285 EMERGENCY DEPT VISIT HI MDM: CPT

## 2024-01-12 PROCEDURE — 80053 COMPREHEN METABOLIC PANEL: CPT

## 2024-01-12 PROCEDURE — 6360000004 HC RX CONTRAST MEDICATION: Performed by: EMERGENCY MEDICINE

## 2024-01-12 PROCEDURE — 85025 COMPLETE CBC W/AUTO DIFF WBC: CPT

## 2024-01-12 RX ORDER — CEPHALEXIN 500 MG/1
500 CAPSULE ORAL 2 TIMES DAILY
Qty: 10 CAPSULE | Refills: 0 | Status: SHIPPED | OUTPATIENT
Start: 2024-01-12 | End: 2024-01-17

## 2024-01-12 RX ORDER — IBUPROFEN 600 MG/1
600 TABLET ORAL 3 TIMES DAILY PRN
Qty: 30 TABLET | Refills: 0 | Status: SHIPPED | OUTPATIENT
Start: 2024-01-12

## 2024-01-12 RX ORDER — ACETAMINOPHEN 325 MG/1
650 TABLET ORAL
Status: COMPLETED | OUTPATIENT
Start: 2024-01-12 | End: 2024-01-12

## 2024-01-12 RX ORDER — IBUPROFEN 600 MG/1
600 TABLET ORAL
Status: COMPLETED | OUTPATIENT
Start: 2024-01-12 | End: 2024-01-12

## 2024-01-12 RX ADMIN — IOPAMIDOL 75 ML: 755 INJECTION, SOLUTION INTRAVENOUS at 19:36

## 2024-01-12 RX ADMIN — ACETAMINOPHEN 650 MG: 325 TABLET ORAL at 18:12

## 2024-01-12 RX ADMIN — IBUPROFEN 600 MG: 600 TABLET, FILM COATED ORAL at 18:12

## 2024-01-12 ASSESSMENT — PAIN DESCRIPTION - LOCATION
LOCATION: BACK
LOCATION: BACK

## 2024-01-12 ASSESSMENT — PAIN SCALES - GENERAL
PAINLEVEL_OUTOF10: 8
PAINLEVEL_OUTOF10: 8

## 2024-01-12 ASSESSMENT — PAIN - FUNCTIONAL ASSESSMENT: PAIN_FUNCTIONAL_ASSESSMENT: 0-10

## 2024-01-12 ASSESSMENT — PAIN DESCRIPTION - ORIENTATION: ORIENTATION: RIGHT

## 2024-01-12 NOTE — ED PROVIDER NOTES
0.6 - 1.2 mg/dL    Est, Glom Filt Rate >60 >60    Calcium 10.0 8.3 - 10.6 mg/dL    Total Protein 7.6 6.4 - 8.2 g/dL    Albumin 4.2 3.4 - 5.0 g/dL    Albumin/Globulin Ratio 1.2 1.1 - 2.2    Total Bilirubin 0.3 0.0 - 1.0 mg/dL    Alkaline Phosphatase 93 40 - 129 U/L    ALT 11 10 - 40 U/L    AST 16 15 - 37 U/L   Urinalysis with Reflex to Culture    Specimen: Urine   Result Value Ref Range    Color, UA Yellow Straw/Yellow    Clarity, UA Clear Clear    Glucose, Ur Negative Negative mg/dL    Bilirubin Urine Negative Negative    Ketones, Urine Negative Negative mg/dL    Specific Gravity, UA 1.025 1.005 - 1.030    Blood, Urine Negative Negative    pH, UA 5.0 5.0 - 8.0    Protein, UA Negative Negative mg/dL    Urobilinogen, Urine 1.0 <2.0 E.U./dL    Nitrite, Urine Negative Negative    Leukocyte Esterase, Urine MODERATE (A) Negative    Microscopic Examination YES     Urine Type NotGiven     Urine Reflex to Culture Not Indicated    Microscopic Urinalysis   Result Value Ref Range    Bacteria, UA None Seen None Seen /HPF    Hyaline Casts, UA 0 0 - 8 /LPF    WBC, UA 6 (H) 0 - 5 /HPF    RBC, UA 1 0 - 4 /HPF    Epithelial Cells, UA 5 0 - 5 /HPF       RADIOLOGY  Any applicable radiology studies including x-ray, CT, MRI, and/or ultrasound, were reviewed independently by me in addition to the radiologist.  I reviewed all radiology images and reports as well from this evaluation.  CT ABDOMEN PELVIS W IV CONTRAST Additional Contrast? None   Final Result   1. No hydronephrosis, nephrolithiasis or obstructive uropathy.  No CT   findings to explain patient's symptoms.               ED COURSE/MDM  Old records reviewed. Labs and imaging reviewed.  Patient seen and evaluated by myself.  Patient does present for she states initially his back pain but really more right flank pain.  Does have some slight CVA tenderness there.  Denies other associated symptoms.  She otherwise looks well.  She is treated for her symptoms here.  Imaging reveals no

## 2024-07-03 ENCOUNTER — OFFICE VISIT (OUTPATIENT)
Dept: PRIMARY CARE CLINIC | Age: 79
End: 2024-07-03
Payer: MEDICARE

## 2024-07-03 VITALS
RESPIRATION RATE: 18 BRPM | OXYGEN SATURATION: 97 % | SYSTOLIC BLOOD PRESSURE: 165 MMHG | HEART RATE: 85 BPM | WEIGHT: 173.8 LBS | DIASTOLIC BLOOD PRESSURE: 102 MMHG | TEMPERATURE: 97.9 F | BODY MASS INDEX: 28.92 KG/M2

## 2024-07-03 DIAGNOSIS — J20.9 ACUTE BRONCHITIS DUE TO INFECTION: Primary | ICD-10-CM

## 2024-07-03 DIAGNOSIS — I10 ESSENTIAL HYPERTENSION: ICD-10-CM

## 2024-07-03 PROCEDURE — G8428 CUR MEDS NOT DOCUMENT: HCPCS | Performed by: FAMILY MEDICINE

## 2024-07-03 PROCEDURE — 1123F ACP DISCUSS/DSCN MKR DOCD: CPT | Performed by: FAMILY MEDICINE

## 2024-07-03 PROCEDURE — 3080F DIAST BP >= 90 MM HG: CPT | Performed by: FAMILY MEDICINE

## 2024-07-03 PROCEDURE — 3077F SYST BP >= 140 MM HG: CPT | Performed by: FAMILY MEDICINE

## 2024-07-03 PROCEDURE — 1036F TOBACCO NON-USER: CPT | Performed by: FAMILY MEDICINE

## 2024-07-03 PROCEDURE — G8419 CALC BMI OUT NRM PARAM NOF/U: HCPCS | Performed by: FAMILY MEDICINE

## 2024-07-03 PROCEDURE — G8399 PT W/DXA RESULTS DOCUMENT: HCPCS | Performed by: FAMILY MEDICINE

## 2024-07-03 PROCEDURE — 1090F PRES/ABSN URINE INCON ASSESS: CPT | Performed by: FAMILY MEDICINE

## 2024-07-03 PROCEDURE — 99214 OFFICE O/P EST MOD 30 MIN: CPT | Performed by: FAMILY MEDICINE

## 2024-07-03 RX ORDER — AMOXICILLIN 500 MG/1
500 CAPSULE ORAL 2 TIMES DAILY
Qty: 20 CAPSULE | Refills: 0 | Status: SHIPPED | OUTPATIENT
Start: 2024-07-03 | End: 2024-07-13

## 2024-07-03 RX ORDER — HYDROCHLOROTHIAZIDE 25 MG/1
25 TABLET ORAL DAILY
Qty: 90 TABLET | Refills: 1 | Status: SHIPPED | OUTPATIENT
Start: 2024-07-03

## 2024-07-03 RX ORDER — BENZONATATE 200 MG/1
200 CAPSULE ORAL 3 TIMES DAILY PRN
Qty: 30 CAPSULE | Refills: 0 | Status: SHIPPED | OUTPATIENT
Start: 2024-07-03 | End: 2024-07-10

## 2024-07-03 SDOH — ECONOMIC STABILITY: FOOD INSECURITY: WITHIN THE PAST 12 MONTHS, THE FOOD YOU BOUGHT JUST DIDN'T LAST AND YOU DIDN'T HAVE MONEY TO GET MORE.: NEVER TRUE

## 2024-07-03 SDOH — ECONOMIC STABILITY: HOUSING INSECURITY
IN THE LAST 12 MONTHS, WAS THERE A TIME WHEN YOU DID NOT HAVE A STEADY PLACE TO SLEEP OR SLEPT IN A SHELTER (INCLUDING NOW)?: NO

## 2024-07-03 SDOH — ECONOMIC STABILITY: FOOD INSECURITY: WITHIN THE PAST 12 MONTHS, YOU WORRIED THAT YOUR FOOD WOULD RUN OUT BEFORE YOU GOT MONEY TO BUY MORE.: NEVER TRUE

## 2024-07-03 SDOH — ECONOMIC STABILITY: INCOME INSECURITY: HOW HARD IS IT FOR YOU TO PAY FOR THE VERY BASICS LIKE FOOD, HOUSING, MEDICAL CARE, AND HEATING?: NOT VERY HARD

## 2024-07-03 ASSESSMENT — PATIENT HEALTH QUESTIONNAIRE - PHQ9
SUM OF ALL RESPONSES TO PHQ9 QUESTIONS 1 & 2: 0
1. LITTLE INTEREST OR PLEASURE IN DOING THINGS: NOT AT ALL
SUM OF ALL RESPONSES TO PHQ QUESTIONS 1-9: 0
2. FEELING DOWN, DEPRESSED OR HOPELESS: NOT AT ALL
SUM OF ALL RESPONSES TO PHQ QUESTIONS 1-9: 0

## 2024-07-03 ASSESSMENT — ENCOUNTER SYMPTOMS
COUGH: 1
CONSTIPATION: 0
DIARRHEA: 0
ABDOMINAL PAIN: 0
SHORTNESS OF BREATH: 1
WHEEZING: 0
BLOOD IN STOOL: 0

## 2024-07-03 NOTE — PROGRESS NOTES
7/3/2024     Lily Arellano (:  1945) is a 78 y.o. female, here for evaluation of the following medical concerns:    Cough  Associated symptoms include shortness of breath. Pertinent negatives include no chest pain, chills, fever or wheezing.     Patient is 78 years old female medical history significant for hypertension.  She presented to the office for checkup. She has productive cough for the past 2 - 3 more weeks and it wakes her up at night.  The phlegm has whitish to yellowish discoloration.  She has no fever and chills , denies shortness of breath or wheezing    Review of Systems   Constitutional:  Negative for activity change, appetite change, chills and fever.   Eyes:  Negative for visual disturbance.   Respiratory:  Positive for cough and shortness of breath. Negative for wheezing.    Cardiovascular:  Negative for chest pain and leg swelling.   Gastrointestinal:  Negative for abdominal pain, blood in stool, constipation and diarrhea.   Genitourinary:  Negative for difficulty urinating, frequency, hematuria, menstrual problem and urgency.   Neurological:  Negative for dizziness and syncope.   Psychiatric/Behavioral:  Negative for behavioral problems.        Prior to Visit Medications    Medication Sig Taking? Authorizing Provider   hydroCHLOROthiazide (HYDRODIURIL) 25 MG tablet Take 1 tablet by mouth daily Yes Alvino Cole MD   amoxicillin (AMOXIL) 500 MG capsule Take 1 capsule by mouth 2 times daily for 10 days Yes Alvino Cole MD   benzonatate (TESSALON) 200 MG capsule Take 1 capsule by mouth 3 times daily as needed for Cough Yes Alvino Cole MD   ibuprofen (ADVIL;MOTRIN) 600 MG tablet Take 1 tablet by mouth 3 times daily as needed for Pain  Niall Gooden, DO   clobetasol (TEMOVATE) 0.05 % ointment Apply topically 2 times daily.  Alvino Cole MD   benzocaine (ORAJEL) 10 % mucosal gel Take by mouth as needed.  Anusha, Fahad Fuller MD   acetaminophen (TYLENOL) 500 MG

## 2024-08-12 ENCOUNTER — HOSPITAL ENCOUNTER (EMERGENCY)
Age: 79
Discharge: HOME | End: 2024-08-12
Payer: MEDICARE

## 2024-08-12 ENCOUNTER — APPOINTMENT (OUTPATIENT)
Dept: GENERAL RADIOLOGY | Age: 79
End: 2024-08-12
Payer: MEDICARE

## 2024-08-12 VITALS
HEIGHT: 65 IN | RESPIRATION RATE: 18 BRPM | SYSTOLIC BLOOD PRESSURE: 124 MMHG | DIASTOLIC BLOOD PRESSURE: 82 MMHG | HEART RATE: 78 BPM | WEIGHT: 170.6 LBS | BODY MASS INDEX: 28.42 KG/M2 | TEMPERATURE: 97.8 F | OXYGEN SATURATION: 100 %

## 2024-08-12 DIAGNOSIS — S39.012A STRAIN OF LUMBAR REGION, INITIAL ENCOUNTER: ICD-10-CM
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
## 2024-08-12 PROCEDURE — 6370000000 HC RX 637 (ALT 250 FOR IP): Performed by: GENERAL ACUTE CARE HOSPITAL

## 2024-08-12 PROCEDURE — 99283 EMERGENCY DEPT VISIT LOW MDM: CPT

## 2024-08-12 PROCEDURE — 72100 X-RAY EXAM L-S SPINE 2/3 VWS: CPT

## 2024-08-12 RX ORDER — LIDOCAINE 50 MG/G
1 PATCH TOPICAL DAILY
Qty: 30 PATCH | Refills: 0 | Status: SHIPPED | OUTPATIENT
Start: 2024-08-12

## 2024-08-12 RX ORDER — ACETAMINOPHEN 500 MG
1000 TABLET ORAL ONCE
Status: COMPLETED | OUTPATIENT
Start: 2024-08-12 | End: 2024-08-12

## 2024-08-12 RX ORDER — LIDOCAINE 4 G/G
1 PATCH TOPICAL DAILY
Status: DISCONTINUED | OUTPATIENT
Start: 2024-08-12 | End: 2024-08-12 | Stop reason: HOSPADM

## 2024-08-12 RX ORDER — CYCLOBENZAPRINE HCL 5 MG
5 TABLET ORAL 2 TIMES DAILY PRN
Qty: 12 TABLET | Refills: 0 | Status: SHIPPED | OUTPATIENT
Start: 2024-08-12 | End: 2024-08-16

## 2024-08-12 RX ADMIN — ACETAMINOPHEN 1000 MG: 500 TABLET ORAL at 20:15

## 2024-08-12 ASSESSMENT — PAIN - FUNCTIONAL ASSESSMENT: PAIN_FUNCTIONAL_ASSESSMENT: 0-10

## 2024-08-12 ASSESSMENT — PAIN DESCRIPTION - LOCATION
LOCATION: BACK
LOCATION: BACK

## 2024-08-12 ASSESSMENT — LIFESTYLE VARIABLES
HOW MANY STANDARD DRINKS CONTAINING ALCOHOL DO YOU HAVE ON A TYPICAL DAY: PATIENT DOES NOT DRINK
HOW OFTEN DO YOU HAVE A DRINK CONTAINING ALCOHOL: NEVER

## 2024-08-12 ASSESSMENT — PAIN DESCRIPTION - DESCRIPTORS: DESCRIPTORS: ACHING

## 2024-08-12 ASSESSMENT — PAIN DESCRIPTION - ORIENTATION: ORIENTATION: RIGHT

## 2024-08-12 ASSESSMENT — PAIN SCALES - GENERAL
PAINLEVEL_OUTOF10: 6
PAINLEVEL_OUTOF10: 6
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
## 2024-08-13 NOTE — DISCHARGE INSTRUCTIONS
Apply ice to the affected area for 20 minutes every 3-4 hours.    Begin gentle stretching exercises tomorrow.    Use topical lidocaine patches and the prescribed Flexeril in addition to OTC Tylenol or ibuprofen as directed for discomfort.  Follow-up with your primary care provider within the next 2 to 3 days.  Return for new or worsening symptoms.

## 2024-08-14 ASSESSMENT — ENCOUNTER SYMPTOMS
ABDOMINAL PAIN: 0
SORE THROAT: 0
VOMITING: 0
VOICE CHANGE: 0
COUGH: 0
NAUSEA: 0
WHEEZING: 0
BACK PAIN: 1
SHORTNESS OF BREATH: 0
CHEST TIGHTNESS: 0

## 2024-08-16 ENCOUNTER — OFFICE VISIT (OUTPATIENT)
Dept: PRIMARY CARE CLINIC | Age: 79
End: 2024-08-16
Payer: MEDICARE

## 2024-08-16 VITALS
SYSTOLIC BLOOD PRESSURE: 170 MMHG | BODY MASS INDEX: 28.32 KG/M2 | WEIGHT: 170 LBS | OXYGEN SATURATION: 100 % | DIASTOLIC BLOOD PRESSURE: 90 MMHG | HEIGHT: 65 IN | TEMPERATURE: 97.3 F | HEART RATE: 63 BPM

## 2024-08-16 DIAGNOSIS — I10 ESSENTIAL HYPERTENSION: ICD-10-CM

## 2024-08-16 PROCEDURE — G8419 CALC BMI OUT NRM PARAM NOF/U: HCPCS | Performed by: INTERNAL MEDICINE

## 2024-08-16 PROCEDURE — 1090F PRES/ABSN URINE INCON ASSESS: CPT | Performed by: INTERNAL MEDICINE

## 2024-08-16 PROCEDURE — 1036F TOBACCO NON-USER: CPT | Performed by: INTERNAL MEDICINE

## 2024-08-16 PROCEDURE — G8427 DOCREV CUR MEDS BY ELIG CLIN: HCPCS | Performed by: INTERNAL MEDICINE

## 2024-08-16 PROCEDURE — 3077F SYST BP >= 140 MM HG: CPT | Performed by: INTERNAL MEDICINE

## 2024-08-16 PROCEDURE — 3080F DIAST BP >= 90 MM HG: CPT | Performed by: INTERNAL MEDICINE

## 2024-08-16 PROCEDURE — 1123F ACP DISCUSS/DSCN MKR DOCD: CPT | Performed by: INTERNAL MEDICINE

## 2024-08-16 PROCEDURE — G8399 PT W/DXA RESULTS DOCUMENT: HCPCS | Performed by: INTERNAL MEDICINE

## 2024-08-16 PROCEDURE — 99214 OFFICE O/P EST MOD 30 MIN: CPT | Performed by: INTERNAL MEDICINE

## 2024-08-16 RX ORDER — AMLODIPINE BESYLATE 5 MG/1
5 TABLET ORAL DAILY
Qty: 30 TABLET | Refills: 3 | Status: SHIPPED | OUTPATIENT
Start: 2024-08-16

## 2024-08-16 RX ORDER — HYDROCHLOROTHIAZIDE 25 MG/1
25 TABLET ORAL DAILY
Qty: 90 TABLET | Refills: 1 | Status: SHIPPED | OUTPATIENT
Start: 2024-08-16

## 2024-08-16 SDOH — ECONOMIC STABILITY: FOOD INSECURITY: WITHIN THE PAST 12 MONTHS, YOU WORRIED THAT YOUR FOOD WOULD RUN OUT BEFORE YOU GOT MONEY TO BUY MORE.: NEVER TRUE

## 2024-08-16 SDOH — ECONOMIC STABILITY: INCOME INSECURITY: HOW HARD IS IT FOR YOU TO PAY FOR THE VERY BASICS LIKE FOOD, HOUSING, MEDICAL CARE, AND HEATING?: NOT HARD AT ALL

## 2024-08-16 SDOH — ECONOMIC STABILITY: FOOD INSECURITY: WITHIN THE PAST 12 MONTHS, THE FOOD YOU BOUGHT JUST DIDN'T LAST AND YOU DIDN'T HAVE MONEY TO GET MORE.: NEVER TRUE

## 2024-08-16 ASSESSMENT — ENCOUNTER SYMPTOMS
BACK PAIN: 1
WHEEZING: 0
CHEST TIGHTNESS: 0

## 2024-08-16 ASSESSMENT — PATIENT HEALTH QUESTIONNAIRE - PHQ9
1. LITTLE INTEREST OR PLEASURE IN DOING THINGS: NOT AT ALL
SUM OF ALL RESPONSES TO PHQ QUESTIONS 1-9: 0
SUM OF ALL RESPONSES TO PHQ QUESTIONS 1-9: 0
2. FEELING DOWN, DEPRESSED OR HOPELESS: NOT AT ALL
SUM OF ALL RESPONSES TO PHQ QUESTIONS 1-9: 0
SUM OF ALL RESPONSES TO PHQ9 QUESTIONS 1 & 2: 0
SUM OF ALL RESPONSES TO PHQ QUESTIONS 1-9: 0
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
## 2024-09-06 ENCOUNTER — OFFICE VISIT (OUTPATIENT)
Dept: PRIMARY CARE CLINIC | Age: 79
End: 2024-09-06

## 2024-09-06 VITALS
OXYGEN SATURATION: 100 % | BODY MASS INDEX: 27.82 KG/M2 | SYSTOLIC BLOOD PRESSURE: 130 MMHG | WEIGHT: 167 LBS | DIASTOLIC BLOOD PRESSURE: 70 MMHG | TEMPERATURE: 97.5 F | HEART RATE: 67 BPM | HEIGHT: 65 IN

## 2024-09-06 DIAGNOSIS — Z29.11 NEED FOR RSV VACCINATION: ICD-10-CM

## 2024-09-06 DIAGNOSIS — Z23 FLU VACCINE NEED: ICD-10-CM

## 2024-09-06 DIAGNOSIS — Z12.39 BREAST CANCER SCREENING, HIGH RISK PATIENT: ICD-10-CM

## 2024-09-06 DIAGNOSIS — R05.2 SUBACUTE COUGH: ICD-10-CM

## 2024-09-06 DIAGNOSIS — Z00.00 MEDICARE ANNUAL WELLNESS VISIT, SUBSEQUENT: Primary | ICD-10-CM

## 2024-09-06 DIAGNOSIS — L30.9 DERMATITIS: ICD-10-CM

## 2024-09-06 DIAGNOSIS — Z23 HIGH PRIORITY FOR COVID-19 VACCINATION: ICD-10-CM

## 2024-09-06 DIAGNOSIS — M54.9 BACK PAIN, UNSPECIFIED BACK LOCATION, UNSPECIFIED BACK PAIN LATERALITY, UNSPECIFIED CHRONICITY: ICD-10-CM

## 2024-09-06 DIAGNOSIS — I10 ESSENTIAL HYPERTENSION: ICD-10-CM

## 2024-09-06 DIAGNOSIS — Z23 NEED FOR SHINGLES VACCINE: ICD-10-CM

## 2024-09-06 RX ORDER — HYDROCHLOROTHIAZIDE 25 MG/1
25 TABLET ORAL DAILY
Qty: 90 TABLET | Refills: 3 | Status: SHIPPED | OUTPATIENT
Start: 2024-09-06

## 2024-09-06 RX ORDER — ACETAMINOPHEN 500 MG
500 TABLET ORAL EVERY 6 HOURS PRN
Qty: 90 TABLET | Refills: 1 | Status: SHIPPED | OUTPATIENT
Start: 2024-09-06

## 2024-09-06 RX ORDER — OMEPRAZOLE 40 MG/1
40 CAPSULE, DELAYED RELEASE ORAL
Qty: 30 CAPSULE | Refills: 5 | Status: SHIPPED | OUTPATIENT
Start: 2024-09-06

## 2024-09-06 RX ORDER — AMLODIPINE BESYLATE 5 MG/1
5 TABLET ORAL DAILY
Qty: 90 TABLET | Refills: 3 | Status: SHIPPED | OUTPATIENT
Start: 2024-09-06

## 2024-09-06 RX ORDER — BENZONATATE 100 MG/1
100-200 CAPSULE ORAL 3 TIMES DAILY PRN
Qty: 60 CAPSULE | Refills: 0 | Status: SHIPPED | OUTPATIENT
Start: 2024-09-06 | End: 2024-09-13

## 2024-09-06 RX ORDER — CLOBETASOL PROPIONATE 0.5 MG/G
OINTMENT TOPICAL
Qty: 45 G | Refills: 3 | Status: SHIPPED | OUTPATIENT
Start: 2024-09-06

## 2024-09-06 SDOH — ECONOMIC STABILITY: FOOD INSECURITY: WITHIN THE PAST 12 MONTHS, YOU WORRIED THAT YOUR FOOD WOULD RUN OUT BEFORE YOU GOT MONEY TO BUY MORE.: NEVER TRUE

## 2024-09-06 SDOH — ECONOMIC STABILITY: FOOD INSECURITY: WITHIN THE PAST 12 MONTHS, THE FOOD YOU BOUGHT JUST DIDN'T LAST AND YOU DIDN'T HAVE MONEY TO GET MORE.: NEVER TRUE

## 2024-09-06 SDOH — ECONOMIC STABILITY: INCOME INSECURITY: HOW HARD IS IT FOR YOU TO PAY FOR THE VERY BASICS LIKE FOOD, HOUSING, MEDICAL CARE, AND HEATING?: NOT HARD AT ALL

## 2024-09-06 ASSESSMENT — ENCOUNTER SYMPTOMS
WHEEZING: 0
CHEST TIGHTNESS: 0
BACK PAIN: 1

## 2024-09-06 ASSESSMENT — PATIENT HEALTH QUESTIONNAIRE - PHQ9
2. FEELING DOWN, DEPRESSED OR HOPELESS: NOT AT ALL
SUM OF ALL RESPONSES TO PHQ9 QUESTIONS 1 & 2: 0
1. LITTLE INTEREST OR PLEASURE IN DOING THINGS: NOT AT ALL
SUM OF ALL RESPONSES TO PHQ QUESTIONS 1-9: 0

## 2024-09-06 ASSESSMENT — LIFESTYLE VARIABLES
HOW OFTEN DO YOU HAVE A DRINK CONTAINING ALCOHOL: NEVER
HOW MANY STANDARD DRINKS CONTAINING ALCOHOL DO YOU HAVE ON A TYPICAL DAY: PATIENT DOES NOT DRINK
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
## 2025-02-01 ENCOUNTER — HOSPITAL ENCOUNTER (EMERGENCY)
Age: 80
Discharge: HOME | End: 2025-02-01
Payer: MEDICARE

## 2025-02-01 ENCOUNTER — APPOINTMENT (OUTPATIENT)
Dept: GENERAL RADIOLOGY | Age: 80
End: 2025-02-01
Payer: MEDICARE

## 2025-02-01 VITALS
HEART RATE: 75 BPM | SYSTOLIC BLOOD PRESSURE: 166 MMHG | BODY MASS INDEX: 28.79 KG/M2 | WEIGHT: 173 LBS | OXYGEN SATURATION: 93 % | DIASTOLIC BLOOD PRESSURE: 80 MMHG | RESPIRATION RATE: 15 BRPM | TEMPERATURE: 98.3 F

## 2025-02-01 DIAGNOSIS — J18.9 PNEUMONIA OF RIGHT MIDDLE LOBE DUE TO INFECTIOUS ORGANISM: Primary | ICD-10-CM

## 2025-02-01 LAB
FLUAV RNA RESP QL NAA+PROBE: NOT DETECTED
FLUBV RNA RESP QL NAA+PROBE: NOT DETECTED
SARS-COV-2 RNA RESP QL NAA+PROBE: NOT DETECTED

## 2025-02-01 PROCEDURE — 71046 X-RAY EXAM CHEST 2 VIEWS: CPT

## 2025-02-01 PROCEDURE — 87636 SARSCOV2 & INF A&B AMP PRB: CPT

## 2025-02-01 PROCEDURE — 99284 EMERGENCY DEPT VISIT MOD MDM: CPT

## 2025-02-01 RX ORDER — LEVOFLOXACIN 500 MG/1
500 TABLET, FILM COATED ORAL DAILY
Qty: 7 TABLET | Refills: 0 | Status: SHIPPED | OUTPATIENT
Start: 2025-02-01 | End: 2025-02-08

## 2025-02-01 ASSESSMENT — ENCOUNTER SYMPTOMS
NAUSEA: 0
RHINORRHEA: 0
VOMITING: 0
WHEEZING: 0
SHORTNESS OF BREATH: 0
COUGH: 1
ABDOMINAL PAIN: 0
DIARRHEA: 0

## 2025-02-01 ASSESSMENT — PAIN - FUNCTIONAL ASSESSMENT: PAIN_FUNCTIONAL_ASSESSMENT: NONE - DENIES PAIN

## 2025-02-01 NOTE — ED PROVIDER NOTES
[facility] EMERGENCY DEPARTMENT  EMERGENCY DEPARTMENT ENCOUNTER        Pt Name: Lily Arellano  MRN: [id number]  Birthdate 1945  Date of evaluation: 2/1/2025  Provider: Filomena Ivory PA-C  PCP: Fahad Tavares MD  Note Started: 11:01 AM EST 2/1/25      TARA. I have evaluated this patient.        CHIEF COMPLAINT       Chief Complaint   Patient presents with    Illness     States nasal congestion, productive cough for 1 week        HISTORY OF PRESENT ILLNESS: 1 or more Elements     History From: Patient  Limitations to history : None    Lily Arellano is a 79 y.o. female who presents for evaluation of cough, congestion and runny nose x 1 week.  She denies any fevers or chills.  No chest pain or shortness of breath.  No known sick contacts with similar symptoms.  No abdominal pain nausea vomiting or diarrhea.  States the cough is productive.  She has no other complaints or concerns at this time.    Nursing Notes were all reviewed and agreed with or any disagreements were addressed in the HPI.    REVIEW OF SYSTEMS :      Review of Systems   Constitutional:  Negative for appetite change, chills and fever.   HENT:  Positive for congestion. Negative for rhinorrhea.    Respiratory:  Positive for cough. Negative for shortness of breath and wheezing.    Cardiovascular:  Negative for chest pain.   Gastrointestinal:  Negative for abdominal pain, diarrhea, nausea and vomiting.   Genitourinary:  Negative for difficulty urinating, dysuria and hematuria.   Musculoskeletal:  Negative for neck pain and neck stiffness.   Skin:  Negative for rash.   Neurological:  Negative for headaches.       Positives and Pertinent negatives as per HPI.     SURGICAL HISTORY     Past Surgical History:   Procedure Laterality Date    ANKLE FRACTURE SURGERY  2009    ROTATOR CUFF REPAIR Right 8/2/13       CURRENTMEDICATIONS       Discharge Medication List as of 2/1/2025 11:50 AM        CONTINUE these medications which have  perihilar pneumonia.  Will be empirically treated with Levaquin.  Encouraged to have close follow-up with PCP.    Patient remained very well-appearing throughout her stay in the ED.  No hypoxia.  Encouraged to follow-up with PCP and/or CDC for additional testing for respiratory panel or COVID as deemed necessary by PCP.  However, encouraged to self quarantine x14 days.  I estimate there is LOW risk for MENINGITIS, PERITONSILLAR ABSCESS, SEPSIS, MALIGNANT OTITIS EXTERNA, OR EPIGLOTTITIS thus I consider the discharge disposition reasonable.  Based on clinical presentation, physical exam and diagnostics, the patient likely has a viral illness. I discussed symptomatic treatment, fluids, and rest. Patient is advised to follow-up with their family doctor within 24-48 hours and return to the ER if she does not improve as anticipated over the next several days, develops difficulty breathing, weakness, inability to take liquids, or has other concerns.  Again, strict return precautions were discussed.  She is agreeable to this plan and stable for discharge at this time.       I am the Primary Clinician of Record.  FINAL IMPRESSION      1. Pneumonia of right middle lobe due to infectious organism          DISPOSITION/PLAN     DISPOSITION Decision To Discharge 02/01/2025 11:48:27 AM   DISPOSITION CONDITION Stable           PATIENT REFERRED TO:  Fahad Tavares MD  [address] [facility] [address] [phone]    Call   For a re-check in 2-3   days    [facility] Emergency Department  [address] [address]  [phone]  Go to   If symptoms worsen      DISCHARGE MEDICATIONS:  Discharge Medication List as of 2/1/2025 11:50 AM        START taking these medications    Details   levoFLOXacin (LEVAQUIN) 500 MG tablet Take 1 tablet by mouth daily for 7 days, Disp-7 tablet, R-0Normal             DISCONTINUED MEDICATIONS:  Discharge Medication List as of 2/1/2025 11:50 AM                 (Please note that

## 2025-02-11 ENCOUNTER — OFFICE VISIT (OUTPATIENT)
Dept: PRIMARY CARE CLINIC | Age: 80
End: 2025-02-11

## 2025-02-11 VITALS
DIASTOLIC BLOOD PRESSURE: 80 MMHG | WEIGHT: 174.6 LBS | HEART RATE: 71 BPM | BODY MASS INDEX: 29.09 KG/M2 | HEIGHT: 65 IN | SYSTOLIC BLOOD PRESSURE: 150 MMHG | OXYGEN SATURATION: 100 % | TEMPERATURE: 97.1 F

## 2025-02-11 DIAGNOSIS — Z23 FLU VACCINE NEED: ICD-10-CM

## 2025-02-11 DIAGNOSIS — Z23 NEED FOR COVID-19 VACCINE: ICD-10-CM

## 2025-02-11 DIAGNOSIS — L30.9 DERMATITIS: ICD-10-CM

## 2025-02-11 DIAGNOSIS — J45.41 MODERATE PERSISTENT REACTIVE AIRWAY DISEASE WITH ACUTE EXACERBATION: ICD-10-CM

## 2025-02-11 DIAGNOSIS — Z29.11 NEED FOR RSV VACCINATION: Primary | ICD-10-CM

## 2025-02-11 DIAGNOSIS — I10 ESSENTIAL HYPERTENSION: ICD-10-CM

## 2025-02-11 RX ORDER — OMEPRAZOLE 40 MG/1
40 CAPSULE, DELAYED RELEASE ORAL
Qty: 30 CAPSULE | Refills: 5 | Status: SHIPPED | OUTPATIENT
Start: 2025-02-11

## 2025-02-11 RX ORDER — METHYLPREDNISOLONE 4 MG/1
TABLET ORAL
Qty: 21 TABLET | Refills: 0 | Status: SHIPPED | OUTPATIENT
Start: 2025-02-11 | End: 2025-02-17

## 2025-02-11 RX ORDER — AMLODIPINE BESYLATE 5 MG/1
5 TABLET ORAL DAILY
Qty: 90 TABLET | Refills: 3 | Status: SHIPPED | OUTPATIENT
Start: 2025-02-11

## 2025-02-11 RX ORDER — FLUTICASONE FUROATE, UMECLIDINIUM BROMIDE AND VILANTEROL TRIFENATATE 200; 62.5; 25 UG/1; UG/1; UG/1
1 POWDER RESPIRATORY (INHALATION) DAILY
Qty: 1 EACH | Refills: 1 | Status: SHIPPED | OUTPATIENT
Start: 2025-02-11

## 2025-02-11 RX ORDER — HYDROCHLOROTHIAZIDE 25 MG/1
25 TABLET ORAL DAILY
Qty: 90 TABLET | Refills: 3 | Status: SHIPPED | OUTPATIENT
Start: 2025-02-11

## 2025-02-11 RX ORDER — CLOBETASOL PROPIONATE 0.5 MG/G
OINTMENT TOPICAL
Qty: 45 G | Refills: 3 | Status: SHIPPED | OUTPATIENT
Start: 2025-02-11

## 2025-02-11 SDOH — ECONOMIC STABILITY: FOOD INSECURITY: WITHIN THE PAST 12 MONTHS, YOU WORRIED THAT YOUR FOOD WOULD RUN OUT BEFORE YOU GOT MONEY TO BUY MORE.: NEVER TRUE

## 2025-02-11 SDOH — ECONOMIC STABILITY: FOOD INSECURITY: WITHIN THE PAST 12 MONTHS, THE FOOD YOU BOUGHT JUST DIDN'T LAST AND YOU DIDN'T HAVE MONEY TO GET MORE.: NEVER TRUE

## 2025-02-11 ASSESSMENT — PATIENT HEALTH QUESTIONNAIRE - PHQ9
SUM OF ALL RESPONSES TO PHQ QUESTIONS 1-9: 0
1. LITTLE INTEREST OR PLEASURE IN DOING THINGS: NOT AT ALL
SUM OF ALL RESPONSES TO PHQ9 QUESTIONS 1 & 2: 0
SUM OF ALL RESPONSES TO PHQ QUESTIONS 1-9: 0
2. FEELING DOWN, DEPRESSED OR HOPELESS: NOT AT ALL

## 2025-02-11 ASSESSMENT — ENCOUNTER SYMPTOMS
CHEST TIGHTNESS: 0
COUGH: 1
WHEEZING: 0
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
## 2025-02-28 ENCOUNTER — CARE COORDINATION (OUTPATIENT)
Dept: CARE COORDINATION | Age: 80
End: 2025-02-28

## 2025-02-28 SDOH — HEALTH STABILITY: PHYSICAL HEALTH: ON AVERAGE, HOW MANY DAYS PER WEEK DO YOU ENGAGE IN MODERATE TO STRENUOUS EXERCISE (LIKE A BRISK WALK)?: 7 DAYS

## 2025-02-28 SDOH — ECONOMIC STABILITY: TRANSPORTATION INSECURITY
IN THE PAST 12 MONTHS, HAS THE LACK OF TRANSPORTATION KEPT YOU FROM MEDICAL APPOINTMENTS OR FROM GETTING MEDICATIONS?: NO

## 2025-02-28 SDOH — ECONOMIC STABILITY: INCOME INSECURITY: IN THE LAST 12 MONTHS, WAS THERE A TIME WHEN YOU WERE NOT ABLE TO PAY THE MORTGAGE OR RENT ON TIME?: NO

## 2025-02-28 SDOH — ECONOMIC STABILITY: TRANSPORTATION INSECURITY
IN THE PAST 12 MONTHS, HAS LACK OF TRANSPORTATION KEPT YOU FROM MEETINGS, WORK, OR FROM GETTING THINGS NEEDED FOR DAILY LIVING?: NO

## 2025-02-28 SDOH — ECONOMIC STABILITY: FOOD INSECURITY: WITHIN THE PAST 12 MONTHS, THE FOOD YOU BOUGHT JUST DIDN'T LAST AND YOU DIDN'T HAVE MONEY TO GET MORE.: NEVER TRUE

## 2025-02-28 SDOH — ECONOMIC STABILITY: FOOD INSECURITY: WITHIN THE PAST 12 MONTHS, YOU WORRIED THAT YOUR FOOD WOULD RUN OUT BEFORE YOU GOT MONEY TO BUY MORE.: NEVER TRUE

## 2025-02-28 SDOH — HEALTH STABILITY: PHYSICAL HEALTH: ON AVERAGE, HOW MANY MINUTES DO YOU ENGAGE IN EXERCISE AT THIS LEVEL?: 30 MIN

## 2025-02-28 ASSESSMENT — SOCIAL DETERMINANTS OF HEALTH (SDOH): HOW HARD IS IT FOR YOU TO PAY FOR THE VERY BASICS LIKE FOOD, HOUSING, MEDICAL CARE, AND HEATING?: NOT HARD AT ALL

## 2025-02-28 NOTE — CARE COORDINATION
Acceptance  Method: Explanation, Teachback  Response: Verbalizes Understanding    COGNITIVE : FALLS-RISK OF, taught by Silvina Guerra RN at 2/28/2025 10:42 AM.  Learner: Patient  Readiness: Acceptance  Method: Explanation, Teachback  Response: Verbalizes Understanding    Educate bathing safety, taught by Silvina Guerra RN at 2/28/2025 10:42 AM.  Learner: Patient  Readiness: Acceptance  Method: Explanation, Teachback  Response: Verbalizes Understanding    Educate ambulation safety, taught by Silvina Guerra RN at 2/28/2025 10:42 AM.  Learner: Patient  Readiness: Acceptance  Method: Explanation, Teachback  Response: Verbalizes Understanding    Education Comments  No comments found.     ,    Goals Addressed                   This Visit's Progress     Conditions and Symptoms        I will schedule office visits, as directed by my provider.  I will keep my appointment or reschedule if I have to cancel.  I will notify my provider of any barriers to my plan of care.  I will notify my provider of any symptoms that indicate a worsening of my condition.    Barriers: overwhelmed by complexity of regimen  Plan for overcoming my barriers: education and support   Confidence: 7/10  Anticipated Goal Completion Date: 5/28/25                  PCP/Specialist follow up:   Future Appointments         Provider Specialty Dept Phone    3/6/2025 12:40 PM Fahad Tavares MD Primary Care [phone]            Follow Up:   Plan for next AC outreach in approximately 3 weeks to complete:  - advance care planning   - goal progression  - education   -mail fall education   -review mailings   Patient  is agreeable to this plan.

## 2025-03-01 ENCOUNTER — HOSPITAL ENCOUNTER (OUTPATIENT)
Age: 80
Discharge: HOME | End: 2025-03-01
Payer: MEDICARE

## 2025-03-01 DIAGNOSIS — I10 ESSENTIAL HYPERTENSION: ICD-10-CM

## 2025-03-01 LAB
BACTERIA URNS QL MICRO: ABNORMAL /HPF
BILIRUB UR QL STRIP.AUTO: NEGATIVE
CLARITY UR: CLEAR
COLOR UR: YELLOW
EPI CELLS #/AREA URNS AUTO: 3 /HPF (ref 0–5)
GLUCOSE UR STRIP.AUTO-MCNC: NEGATIVE MG/DL
HGB UR QL STRIP.AUTO: NEGATIVE
HYALINE CASTS #/AREA URNS AUTO: 0 /LPF (ref 0–8)
KETONES UR STRIP.AUTO-MCNC: ABNORMAL MG/DL
LEUKOCYTE ESTERASE UR QL STRIP.AUTO: ABNORMAL
NITRITE UR QL STRIP.AUTO: NEGATIVE
PH UR STRIP.AUTO: 5.5 [PH] (ref 5–8)
PROT UR STRIP.AUTO-MCNC: NEGATIVE MG/DL
RBC CLUMPS #/AREA URNS AUTO: 2 /HPF (ref 0–4)
SP GR UR STRIP.AUTO: 1.01 (ref 1–1.03)
UA DIPSTICK W REFLEX MICRO PNL UR: YES
URN SPEC COLLECT METH UR: ABNORMAL
UROBILINOGEN UR STRIP-ACNC: 0.2 E.U./DL
WBC #/AREA URNS AUTO: 15 /HPF (ref 0–5)

## 2025-03-01 PROCEDURE — 85025 COMPLETE CBC W/AUTO DIFF WBC: CPT

## 2025-03-01 PROCEDURE — 80053 COMPREHEN METABOLIC PANEL: CPT

## 2025-03-01 PROCEDURE — 36415 COLL VENOUS BLD VENIPUNCTURE: CPT

## 2025-03-01 PROCEDURE — 81001 URINALYSIS AUTO W/SCOPE: CPT

## 2025-03-01 PROCEDURE — 84443 ASSAY THYROID STIM HORMONE: CPT

## 2025-03-01 PROCEDURE — 83036 HEMOGLOBIN GLYCOSYLATED A1C: CPT

## 2025-03-01 PROCEDURE — 82306 VITAMIN D 25 HYDROXY: CPT

## 2025-03-01 PROCEDURE — 80061 LIPID PANEL: CPT

## 2025-03-02 LAB
25(OH)D3 SERPL-MCNC: 15.5 NG/ML
ALBUMIN SERPL-MCNC: 4.3 G/DL (ref 3.4–5)
ALBUMIN/GLOB SERPL: 1.3 {RATIO} (ref 1.1–2.2)
ALP SERPL-CCNC: 92 U/L (ref 40–129)
ALT SERPL-CCNC: 12 U/L (ref 10–40)
ANION GAP SERPL CALCULATED.3IONS-SCNC: 15 MMOL/L (ref 3–16)
AST SERPL-CCNC: 24 U/L (ref 15–37)
BASOPHILS # BLD: 0.1 K/UL (ref 0–0.2)
BASOPHILS NFR BLD: 1.5 %
BILIRUB SERPL-MCNC: 0.4 MG/DL (ref 0–1)
BUN SERPL-MCNC: 19 MG/DL (ref 7–20)
CALCIUM SERPL-MCNC: 9.8 MG/DL (ref 8.3–10.6)
CHLORIDE SERPL-SCNC: 107 MMOL/L (ref 99–110)
CHOLEST SERPL-MCNC: 223 MG/DL (ref 0–199)
CO2 SERPL-SCNC: 22 MMOL/L (ref 21–32)
CREAT SERPL-MCNC: 0.8 MG/DL (ref 0.6–1.2)
DEPRECATED RDW RBC AUTO: 14.8 % (ref 12.4–15.4)
EOSINOPHIL # BLD: 0.1 K/UL (ref 0–0.6)
EOSINOPHIL NFR BLD: 1.2 %
EST. AVERAGE GLUCOSE BLD GHB EST-MCNC: 119.8 MG/DL
GFR SERPLBLD CREATININE-BSD FMLA CKD-EPI: 74 ML/MIN/{1.73_M2}
GLUCOSE SERPL-MCNC: 78 MG/DL (ref 70–99)
HBA1C MFR BLD: 5.8 %
HCT VFR BLD AUTO: 41.2 % (ref 36–48)
HDLC SERPL-MCNC: 65 MG/DL (ref 40–60)
HGB BLD-MCNC: 13.8 G/DL (ref 12–16)
LDLC SERPL CALC-MCNC: 148 MG/DL
LYMPHOCYTES # BLD: 2.1 K/UL (ref 1–5.1)
LYMPHOCYTES NFR BLD: 45 %
MCH RBC QN AUTO: 29.1 PG (ref 26–34)
MCHC RBC AUTO-ENTMCNC: 33.4 G/DL (ref 31–36)
MCV RBC AUTO: 87 FL (ref 80–100)
MONOCYTES # BLD: 0.3 K/UL (ref 0–1.3)
MONOCYTES NFR BLD: 6.2 %
NEUTROPHILS # BLD: 2.2 K/UL (ref 1.7–7.7)
NEUTROPHILS NFR BLD: 46.1 %
PLATELET # BLD AUTO: 203 K/UL (ref 135–450)
PMV BLD AUTO: 10.2 FL (ref 5–10.5)
POTASSIUM SERPL-SCNC: 3.8 MMOL/L (ref 3.5–5.1)
PROT SERPL-MCNC: 7.6 G/DL (ref 6.4–8.2)
RBC # BLD AUTO: 4.74 M/UL (ref 4–5.2)
SODIUM SERPL-SCNC: 144 MMOL/L (ref 136–145)
TRIGL SERPL-MCNC: 52 MG/DL (ref 0–150)
TSH SERPL DL<=0.005 MIU/L-ACNC: 2.28 UIU/ML (ref 0.27–4.2)
VLDLC SERPL CALC-MCNC: 10 MG/DL
WBC # BLD AUTO: 4.8 K/UL (ref 4–11)

## 2025-03-04 ENCOUNTER — CARE COORDINATION (OUTPATIENT)
Dept: CARE COORDINATION | Age: 80
End: 2025-03-04

## 2025-03-04 NOTE — CARE COORDINATION
Advance Care Planning Note      Date: 3/4/2025    Patient Current Location:  [place]    ACP Specialist:  JARED Washington, LSW    Date Referral Received:2/28/25    Initial Outreach:     Attempted outreach call to patient in follow-up to referral from: Ambulatory Care Manager Silvina Guerra  requesting assistance with new advance directive completion. Unable to reach patient. Left detailed message.    Outreach Attempts:   HIPAA compliant voicemail left for patient.     Follow Up:   Plan for next outreach in one week. Forms will be mailed 3/5.        Thank you for this referral.

## 2025-03-04 NOTE — CARE COORDINATION
Incoming call from patient. SW introduced and purpose of call explained. Patient expressed that she was not interested in completing Advance Directive forms at this time.

## 2025-03-14 ENCOUNTER — OFFICE VISIT (OUTPATIENT)
Dept: PRIMARY CARE CLINIC | Age: 80
End: 2025-03-14

## 2025-03-14 VITALS
HEIGHT: 65 IN | DIASTOLIC BLOOD PRESSURE: 90 MMHG | WEIGHT: 172.4 LBS | BODY MASS INDEX: 28.72 KG/M2 | SYSTOLIC BLOOD PRESSURE: 145 MMHG | HEART RATE: 68 BPM | OXYGEN SATURATION: 100 % | TEMPERATURE: 97.2 F

## 2025-03-14 DIAGNOSIS — J45.41 MODERATE PERSISTENT REACTIVE AIRWAY DISEASE WITH ACUTE EXACERBATION: ICD-10-CM

## 2025-03-14 DIAGNOSIS — T78.40XA ALLERGY, INITIAL ENCOUNTER: ICD-10-CM

## 2025-03-14 DIAGNOSIS — E55.9 VITAMIN D DEFICIENCY: Primary | ICD-10-CM

## 2025-03-14 DIAGNOSIS — I10 ESSENTIAL HYPERTENSION: ICD-10-CM

## 2025-03-14 RX ORDER — AMLODIPINE BESYLATE 10 MG/1
10 TABLET ORAL DAILY
Qty: 90 TABLET | Refills: 4 | Status: SHIPPED | OUTPATIENT
Start: 2025-03-14

## 2025-03-14 RX ORDER — ERGOCALCIFEROL 1.25 MG/1
50000 CAPSULE, LIQUID FILLED ORAL WEEKLY
Qty: 12 CAPSULE | Refills: 4 | Status: SHIPPED | OUTPATIENT
Start: 2025-03-14

## 2025-03-14 RX ORDER — HYDROCHLOROTHIAZIDE 25 MG/1
25 TABLET ORAL DAILY
Qty: 90 TABLET | Refills: 3 | Status: SHIPPED | OUTPATIENT
Start: 2025-03-14

## 2025-03-14 RX ORDER — CETIRIZINE HYDROCHLORIDE 10 MG/1
10 TABLET ORAL DAILY
Qty: 30 TABLET | Refills: 0 | Status: SHIPPED | OUTPATIENT
Start: 2025-03-14

## 2025-03-14 ASSESSMENT — ENCOUNTER SYMPTOMS
CHEST TIGHTNESS: 0
RHINORRHEA: 1
BACK PAIN: 1

## 2025-03-14 ASSESSMENT — PATIENT HEALTH QUESTIONNAIRE - PHQ9
SUM OF ALL RESPONSES TO PHQ QUESTIONS 1-9: 0
1. LITTLE INTEREST OR PLEASURE IN DOING THINGS: NOT AT ALL
SUM OF ALL RESPONSES TO PHQ QUESTIONS 1-9: 0
2. FEELING DOWN, DEPRESSED OR HOPELESS: NOT AT ALL
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
## 2025-03-24 ENCOUNTER — CARE COORDINATION (OUTPATIENT)
Dept: CARE COORDINATION | Age: 80
End: 2025-03-24

## 2025-03-24 NOTE — CARE COORDINATION
Ambulatory Care Coordination Note     3/24/2025 3:40 PM     Patient outreach attempt by this ACM today to perform care management follow up . ACM was unable to reach the patient by telephone today;   voicemail full and unable to leave a message.      ACM: Silvina Guerra RN      PCP/Specialist follow up:   Future Appointments         Provider Specialty Dept Phone    4/11/2025 2:00 PM Fahad Tavares MD Primary Care [phone]            Follow Up:   Plan for next ACM outreach in approximately 3 weeks to complete:  - advance care planning   - goal progression  - education   -mail fall education   -review mailings

## 2025-04-11 ENCOUNTER — OFFICE VISIT (OUTPATIENT)
Dept: PRIMARY CARE CLINIC | Age: 80
End: 2025-04-11

## 2025-04-11 VITALS
HEIGHT: 65 IN | HEART RATE: 64 BPM | BODY MASS INDEX: 28.79 KG/M2 | SYSTOLIC BLOOD PRESSURE: 180 MMHG | TEMPERATURE: 98.3 F | OXYGEN SATURATION: 100 % | WEIGHT: 172.8 LBS | DIASTOLIC BLOOD PRESSURE: 100 MMHG

## 2025-04-11 DIAGNOSIS — Z00.00 MEDICARE ANNUAL WELLNESS VISIT, SUBSEQUENT: Primary | ICD-10-CM

## 2025-04-11 DIAGNOSIS — Z23 NEED FOR SHINGLES VACCINE: ICD-10-CM

## 2025-04-11 DIAGNOSIS — Z29.11 NEED FOR RSV VACCINATION: ICD-10-CM

## 2025-04-11 DIAGNOSIS — I10 ESSENTIAL HYPERTENSION: ICD-10-CM

## 2025-04-11 DIAGNOSIS — H91.13 PRESBYCUSIS OF BOTH EARS: ICD-10-CM

## 2025-04-11 DIAGNOSIS — E55.9 VITAMIN D DEFICIENCY: ICD-10-CM

## 2025-04-11 DIAGNOSIS — H53.9 VISION ABNORMALITIES: ICD-10-CM

## 2025-04-11 RX ORDER — HYDROCHLOROTHIAZIDE 25 MG/1
25 TABLET ORAL DAILY
Qty: 90 TABLET | Refills: 3 | Status: SHIPPED | OUTPATIENT
Start: 2025-04-11

## 2025-04-11 RX ORDER — AMLODIPINE BESYLATE 10 MG/1
10 TABLET ORAL DAILY
Qty: 90 TABLET | Refills: 4 | Status: SHIPPED | OUTPATIENT
Start: 2025-04-11

## 2025-04-11 RX ORDER — ERGOCALCIFEROL 1.25 MG/1
50000 CAPSULE, LIQUID FILLED ORAL WEEKLY
Qty: 12 CAPSULE | Refills: 4 | Status: SHIPPED | OUTPATIENT
Start: 2025-04-11

## 2025-04-11 RX ORDER — OMEPRAZOLE 40 MG/1
40 CAPSULE, DELAYED RELEASE ORAL
Qty: 30 CAPSULE | Refills: 5 | Status: SHIPPED | OUTPATIENT
Start: 2025-04-11

## 2025-04-11 ASSESSMENT — PATIENT HEALTH QUESTIONNAIRE - PHQ9
2. FEELING DOWN, DEPRESSED OR HOPELESS: NOT AT ALL
1. LITTLE INTEREST OR PLEASURE IN DOING THINGS: NOT AT ALL
SUM OF ALL RESPONSES TO PHQ QUESTIONS 1-9: 0

## 2025-04-11 ASSESSMENT — ENCOUNTER SYMPTOMS
CHEST TIGHTNESS: 0
BACK PAIN: 1

## 2025-04-11 NOTE — PATIENT INSTRUCTIONS
Instructions.\"  Current as of: July 31, 2024  Content Version: 14.4  © 7197-1226 [employer].   Care instructions adapted under license by [employer]. If you have questions about a medical condition or this instruction, always ask your healthcare professional. [employer], disclaims any warranty or liability for your use of this information.    Personalized Preventive Plan for Lily Arellano - 4/11/2025  Medicare offers a range of preventive health benefits. Some of the tests and screenings are paid in full while other may be subject to a deductible, co-insurance, and/or copay.  Some of these benefits include a comprehensive review of your medical history including lifestyle, illnesses that may run in your family, and various assessments and screenings as appropriate.  After reviewing your medical record and screening and assessments performed today your provider may have ordered immunizations, labs, imaging, and/or referrals for you.  A list of these orders (if applicable) as well as your Preventive Care list are included within your After Visit Summary for your review.

## 2025-04-11 NOTE — PROGRESS NOTES
have a Living Will?: (!) No    Intervention:  has NO advanced directive - information provided                     Objective   Vitals:    04/11/25 1349 04/11/25 1432   BP: (!) 175/101 (!) 180/100   BP Site: Left Upper Arm    Patient Position: Sitting    BP Cuff Size: Medium Adult    Pulse: 64    Temp: 98.3 °F (36.8 °C)    TempSrc: Temporal    SpO2: 100%    Weight: 78.4 kg (172 lb 12.8 oz)    Height: 1.651 m (5' 5\")       Body mass index is 28.76 kg/m².                  No Known Allergies  Prior to Visit Medications    Medication Sig Taking? Authorizing Provider   omeprazole (PRILOSEC) 40 MG delayed release capsule Take 1 capsule by mouth every morning (before breakfast) Yes Fahad Tavares MD   amLODIPine (NORVASC) 10 MG tablet Take 1 tablet by mouth daily Yes Fahad Tavares MD   hydroCHLOROthiazide (HYDRODIURIL) 25 MG tablet Take 1 tablet by mouth daily Yes Fahad Tavares MD   vitamin D (ERGOCALCIFEROL) 1.25 MG (25705 UT) CAPS capsule Take 1 capsule by mouth once a week Yes Fahad Tavares MD   cetirizine (ZYRTEC) 10 MG tablet Take 1 tablet by mouth daily Yes Fahad Tavares MD   acetaminophen (TYLENOL) 500 MG tablet Take 1 tablet by mouth every 6 hours as needed for Pain Yes Fahad Tavares MD       CareTeam (Including outside providers/suppliers regularly involved in providing care):   Patient Care Team:  Fahad Tavares MD as PCP - General (Internal Medicine)  Fahad Tavares MD as PCP - Empaneled Provider  Silvina Guerra RN as Ambulatory Care Manager     Recommendations for Preventive Services Due: see orders and patient instructions/AVS.  Recommended screening schedule for the next 5-10 years is provided to the patient in written form: see Patient Instructions/AVS.     Reviewed and updated this visit:  Tobacco  Allergies  Meds  Med Hx  Surg Hx  Fam Hx  Sexual Hx

## 2025-04-15 ENCOUNTER — PROCEDURE VISIT (OUTPATIENT)
Dept: AUDIOLOGY | Age: 80
End: 2025-04-15

## 2025-04-15 ENCOUNTER — HOSPITAL ENCOUNTER (EMERGENCY)
Age: 80
Discharge: HOME | End: 2025-04-15
Payer: MEDICARE

## 2025-04-15 VITALS
TEMPERATURE: 97.5 F | SYSTOLIC BLOOD PRESSURE: 150 MMHG | OXYGEN SATURATION: 100 % | RESPIRATION RATE: 14 BRPM | DIASTOLIC BLOOD PRESSURE: 88 MMHG | HEART RATE: 63 BPM

## 2025-04-15 DIAGNOSIS — H61.23 BILATERAL IMPACTED CERUMEN: Primary | ICD-10-CM

## 2025-04-15 DIAGNOSIS — H91.8X9 OTHER SPECIFIED HEARING LOSS, UNSPECIFIED EAR: Primary | ICD-10-CM

## 2025-04-15 DIAGNOSIS — H65.01 RIGHT ACUTE SEROUS OTITIS MEDIA, RECURRENCE NOT SPECIFIED: ICD-10-CM

## 2025-04-15 PROCEDURE — 69210 REMOVE IMPACTED EAR WAX UNI: CPT

## 2025-04-15 PROCEDURE — 99283 EMERGENCY DEPT VISIT LOW MDM: CPT

## 2025-04-15 ASSESSMENT — PAIN DESCRIPTION - LOCATION: LOCATION: EAR

## 2025-04-15 ASSESSMENT — PAIN DESCRIPTION - DESCRIPTORS: DESCRIPTORS: ACHING

## 2025-04-15 ASSESSMENT — PAIN DESCRIPTION - ORIENTATION: ORIENTATION: RIGHT

## 2025-04-15 ASSESSMENT — PAIN - FUNCTIONAL ASSESSMENT: PAIN_FUNCTIONAL_ASSESSMENT: 0-10

## 2025-04-15 ASSESSMENT — PAIN SCALES - GENERAL: PAINLEVEL_OUTOF10: 4

## 2025-04-15 NOTE — ED PROVIDER NOTES
[facility] EMERGENCY DEPARTMENT  EMERGENCY DEPARTMENT ENCOUNTER        Pt Name: Lily Arellano  MRN: [id number]  Birthdate 1945  Date of evaluation: 4/15/2025  Provider: Michael Marquez PA-C  PCP: Fahad Tavares MD  Note Started: 4:26 PM EDT 4/15/25      TARA. I have evaluated this patient.        CHIEF COMPLAINT       Chief Complaint   Patient presents with    Ear Pain     Patient arrives through triage with complaints of right ear pain and reduced hearing in that ear. Denies fever.        HISTORY OF PRESENT ILLNESS: 1 or more Elements     History From: Patient  Limitations to history : None    Lily Arellano is a 79 y.o. female who presents for evaluation of right ear discomfort and hearing loss.  Patient reports this is worsening of the last 3 to 4 days.  She states that every 5 to 6 months she has a cerumen impaction that needs to be removed.  She denies any Q-tips.  She denies head trauma, vertigo, dizziness.  She denies fevers, chills, shortness of breath, chest pain, change in bowel or bladder habits.    Nursing Notes were all reviewed and agreed with or any disagreements were addressed in the HPI.    REVIEW OF SYSTEMS :      Review of Systems    Positives and Pertinent negatives as per HPI.     SURGICAL HISTORY     Past Surgical History:   Procedure Laterality Date    ANKLE FRACTURE SURGERY  2009    ROTATOR CUFF REPAIR Right 8/2/13       CURRENTMEDICATIONS       Previous Medications    ACETAMINOPHEN (TYLENOL) 500 MG TABLET    Take 1 tablet by mouth every 6 hours as needed for Pain    AMLODIPINE (NORVASC) 10 MG TABLET    Take 1 tablet by mouth daily    CETIRIZINE (ZYRTEC) 10 MG TABLET    Take 1 tablet by mouth daily    HYDROCHLOROTHIAZIDE (HYDRODIURIL) 25 MG TABLET    Take 1 tablet by mouth daily    OMEPRAZOLE (PRILOSEC) 40 MG DELAYED RELEASE CAPSULE    Take 1 capsule by mouth every morning (before breakfast)    VITAMIN D (ERGOCALCIFEROL) 1.25 MG (01286 UT) CAPS CAPSULE    Take

## 2025-04-16 ENCOUNTER — CARE COORDINATION (OUTPATIENT)
Dept: CARE COORDINATION | Age: 80
End: 2025-04-16

## 2025-04-16 DIAGNOSIS — I10 HYPERTENSION, UNSPECIFIED TYPE: Primary | ICD-10-CM

## 2025-04-16 NOTE — CARE COORDINATION
Ambulatory Care Coordination Note     2025 12:09 PM     Patient Current Location:  Home: [address] [place], [place] [id number]     ACM contacted the patient by telephone. Verified name and  with patient as identifiers.         ACM: Silvina Guerra RN     Challenges to be reviewed by the provider   Additional needs identified to be addressed with provider No  none               Method of communication with provider: none.    Utilization: Has the patient been seen in the ED since your last call? Yes,   Discharge Date: 4/15/25   Discharge Facility: [facility]  Reason for ED Visit: ear pain   Visit Diagnosis: Bilateral impacted cerumen    Number of ED visits in the last 6 months: 2      Do you have any ongoing symptoms? Yes, my symptoms have improved.   Current symptoms: hearing loss.    Did you call your PCP prior to going to the ED? No, did not call the PCP office.     Review of Discharge Instructions:   [x] AVS discharge instructions  [x] Right Care, Right Place, Right Time document  [x] Medication changes  [x] Follow up appointments  [x] Referral follow up   []        Care Summary Note:     ACM made care coordination outreach and spoke with patient. Patient went to the ED for hearing loss. Feeling a little better. Stated that her ears were cleaned out. AVS reviewed. Will  antibiotic today. Discussed taking entire antibiotic regardless if feeling better. Scheduled to see PCP 25. Stated that she will call ENT to schedule follow up appointment. Stated that all mediations are filled and she is taking as prescribed. Blood pressure has improved since taking medications. Does not have a blood pressure cuff at home. Discussed RPM and patient is agreeable. Aware that she will receive a call regarding RPM equipment. ACP reviewed. Discussed with patient signs and symptoms to monitor and importance of reporting concerns to appropriate site of care for early intervention. Patient has VU.

## 2025-04-16 NOTE — PROGRESS NOTES
Remote Patient Monitoring Treatment Plan    Received request from ACM/CTN Silvina Guerra, RN   to order remote patient monitoring for in home monitoring of HTN; Condition managed by PCP.  and order completed.     Patient will be monitoring blood pressure .      Patient will engage in Remote Patient Monitoring each day to develop the skills necessary for self management.       RPM Care Team Responsibilities:   Alerts will be reviewed daily and addressed within 2-4 hours during operational hours (Monday -Friday 9 am-4 pm)  Alert response and intervention documented in patient medical record  Alert response escalated to PCP per protocol and documented in patient medical record  Patient monitored over approximately  days  Discharge from program based on self-management readiness    See care coordination encounters for additional details.      Per ACM: HTN not hypotension requested  Market: [employer]  · Kit requested: complete kit  · Care Plan preset: HTN; condition managed by Dr. Tavares.  · BP cuff size requested: regular (9.05\"-15.74\")  · Weight Scale requested: regular (<330lbs)  · RPM Kit Number  (If not applicable, please enter N/A)

## 2025-04-17 ENCOUNTER — CARE COORDINATION (OUTPATIENT)
Dept: PRIMARY CARE CLINIC | Age: 80
End: 2025-04-17

## 2025-04-17 NOTE — CARE COORDINATION
RPM Kit Order    Remote Patient Kit Ordering Note      Date/Time:  4/17/2025 10:22 AM      CCSS placed phone call to patient/family today to notify of RPM kit order; patient/family was available; discussed the following topics below and all questions answered.    [x] [facility]  [x] Patient will receive package over the next 1-3 business days. Someone 21 years or older must be present to sign for UPS delivery.  [x] HRS will contact patient within 24 hours, an HRS  will call the patient directly: If the patient does not answer, HRS will follow up with the clinical team notifying them about the unsuccessful attempt to contact the patient. HRS will make three call attempts to the patient.Provide patient with [facility] Virtual install number is: [phone].  [x] The RPM Nurse will contact patient once equipment is active to welcome them to the program.                                                         [x] Hours of RPM monitoring - Monday-Friday [id number]; encourage patient to get vitals entered by Noon each day to have the alert addressed same day.  [x][facility] mailed RPM Patient flyer to patient.                      ACM made aware the RPM kit has been ordered.

## 2025-04-18 NOTE — PROGRESS NOTES
to: Anusha, Fahad Fuller MD         Degree of   Hearing Sensitivity dB Range   Within Normal Limits (WNL) 0 - 20   Mild 20 - 40   Moderate 40 - 55   Moderately-Severe 55 - 70   Severe 70 - 90   Profound 90 +

## 2025-04-21 ENCOUNTER — TELEPHONE (OUTPATIENT)
Dept: ENT CLINIC | Age: 80
End: 2025-04-21

## 2025-04-22 ENCOUNTER — TELEPHONE (OUTPATIENT)
Dept: ENT CLINIC | Age: 80
End: 2025-04-22

## 2025-04-22 NOTE — TELEPHONE ENCOUNTER
Any Monday through Thursday 9 - 11 AM.    
Pt ears clogged, went to ER to get it cleaned out, still can't hear, was told had puss behind ear, pt on abx, please advise on if/when you would like to see pt.   
Pt schedule   
No

## 2025-04-24 ENCOUNTER — OFFICE VISIT (OUTPATIENT)
Dept: ENT CLINIC | Age: 80
End: 2025-04-24
Payer: MEDICARE

## 2025-04-24 ENCOUNTER — PROCEDURE VISIT (OUTPATIENT)
Dept: AUDIOLOGY | Age: 80
End: 2025-04-24
Payer: MEDICARE

## 2025-04-24 VITALS
OXYGEN SATURATION: 98 % | HEART RATE: 73 BPM | WEIGHT: 168 LBS | SYSTOLIC BLOOD PRESSURE: 128 MMHG | TEMPERATURE: 98.5 F | DIASTOLIC BLOOD PRESSURE: 80 MMHG | BODY MASS INDEX: 27.99 KG/M2 | HEIGHT: 65 IN

## 2025-04-24 DIAGNOSIS — H91.93 BILATERAL HEARING LOSS, UNSPECIFIED HEARING LOSS TYPE: Primary | Chronic | ICD-10-CM

## 2025-04-24 DIAGNOSIS — H90.0 CONDUCTIVE HEARING LOSS OF BOTH EARS: ICD-10-CM

## 2025-04-24 DIAGNOSIS — H90.3 SENSORINEURAL HEARING LOSS, BILATERAL: Primary | ICD-10-CM

## 2025-04-24 DIAGNOSIS — H61.23 IMPACTED CERUMEN OF BOTH EARS: ICD-10-CM

## 2025-04-24 PROCEDURE — 99203 OFFICE O/P NEW LOW 30 MIN: CPT | Performed by: OTOLARYNGOLOGY

## 2025-04-24 PROCEDURE — 1090F PRES/ABSN URINE INCON ASSESS: CPT | Performed by: OTOLARYNGOLOGY

## 2025-04-24 PROCEDURE — G8427 DOCREV CUR MEDS BY ELIG CLIN: HCPCS | Performed by: OTOLARYNGOLOGY

## 2025-04-24 PROCEDURE — 69210 REMOVE IMPACTED EAR WAX UNI: CPT | Performed by: OTOLARYNGOLOGY

## 2025-04-24 PROCEDURE — 92557 COMPREHENSIVE HEARING TEST: CPT

## 2025-04-24 PROCEDURE — 92567 TYMPANOMETRY: CPT

## 2025-04-24 PROCEDURE — 1123F ACP DISCUSS/DSCN MKR DOCD: CPT | Performed by: OTOLARYNGOLOGY

## 2025-04-24 PROCEDURE — G8399 PT W/DXA RESULTS DOCUMENT: HCPCS | Performed by: OTOLARYNGOLOGY

## 2025-04-24 PROCEDURE — G8419 CALC BMI OUT NRM PARAM NOF/U: HCPCS | Performed by: OTOLARYNGOLOGY

## 2025-04-24 PROCEDURE — 1036F TOBACCO NON-USER: CPT | Performed by: OTOLARYNGOLOGY

## 2025-04-24 PROCEDURE — 1159F MED LIST DOCD IN RCRD: CPT | Performed by: OTOLARYNGOLOGY

## 2025-04-24 NOTE — PROGRESS NOTES
pneumatic mobility.  Lily reported improved hearing, back to usual level, after cerumen removal.     HEARING ASSESSMENT (after ear cleaning):    Finger rub:  Able to hear finger rub bilaterally.   Tuning fork tests, [address] tuning fork:  Carson test was heard in the left ear.    Rinne air > bone bilaterally.          IMPRESSION / DIAGNOSES / ORDERS       Lily was seen today for cerumen impaction and hearing loss.    Diagnoses and all orders for this visit:    Bilateral hearing loss, unspecified hearing loss type  Comments:  Probable underlying chronic sensorineural hearing loss of right ear versus bilateral chronic sensorineural hearing loss greater in the right.  Orders:  -     [facility]    Impacted cerumen of both ears    Conductive hearing loss of both ears  Comments:  Secondary to bilateral cerumen impaction and superimposed on chronic hearing loss.           RECOMMENDATIONS/PLAN      Proceed with audiogram today.  'Patient was advised to review and follow my instructions in the AVS, which we reviewed together, and to call and speak to the MA or LPN with any questions regarding these instructions.  Return for recheck/follow-up after hearing test.          Patient Instructions   Proceed with an audiogram (hearing test) for evaluation of your hearing loss.    Schedule an appointment for ear recheck and possible cleaning in the future if your hearing is decreased, or you have a sensation of ear wax build up or are told you have ear wax build up by your primary physician or other health care provider.    You may use an over the counter ear wax removal kit (such as Murine, Bausch and Lomb, NeilMed, or Debrox wax removal system) for ear wax removal, as needed.  It may help to use Debrox (OTC) for 4 days prior to future visits for ear cleaning.  This may soften your ear wax and facilitate removal of the wax.        NO Q-TIPS OR OTHER INSTRUMENTS/OBJECTS IN THE EARS   You should never clean your ears

## 2025-04-24 NOTE — PATIENT INSTRUCTIONS
Proceed with an audiogram (hearing test) for evaluation of your hearing loss.    Schedule an appointment for ear recheck and possible cleaning in the future if your hearing is decreased, or you have a sensation of ear wax build up or are told you have ear wax build up by your primary physician or other health care provider.    You may use an over the counter ear wax removal kit (such as Murine, Bausch and Lomb, NeilMed, or Debrox wax removal system) for ear wax removal, as needed.  It may help to use Debrox (OTC) for 4 days prior to future visits for ear cleaning.  This may soften your ear wax and facilitate removal of the wax.        NO Q-TIPS OR OTHER INSTRUMENTS/OBJECTS IN THE EARS   You should never clean your ears with a Q-tip, cotton tipped applicator, Suzanne pin, paper clip, safety pin, pen cap, or any other instrument.  This will tend to push wax in deeper and pack the ear canal with wax.  There is a high risk and danger of this practice, especially rupture of ear drum, dislocation or other damage to ossicles, and permanent, irreversible, and irreparable hearing loss.  It may cause inflammation and irritation of the ear canal and cause itching or pain.  I recommend only use of one the several ear wax removal kits available \"over the counter\" if you feel a need to try to remove ear wax.  For example, Murine, Bausch and Lomb, NeilMed, or Debrox ear wax removal kits may be used for ear wax removal, as needed.  No other methods should be self used for cleaning your ears.

## 2025-04-24 NOTE — PROGRESS NOTES
recorded speech stimuli.    Tympanometry: Normal peak pressure and compliance, Type A tympanogram, consistent with normal middle ear function.       Reliability: Good   Transducer: Inserts    See scanned audiogram dated 4/24/2025 for results.        PATIENT EDUCATION:       The following items were discussed with the patient:   - Good Communication Strategies  - Hearing Loss and Hearing Aids    Educational information was shared in the After Visit Summary.                                                RECOMMENDATIONS:                                                                                                                                                                                                                                                            The following items are recommended based on patient report and results from today's appointment:   - Continue medical follow-up with Wei Anderson MD.   - Retest hearing as medically indicated and/or sooner if a change in hearing is noted.  - If desired, schedule a Hearing Aid Evaluation (HAE) appointment to discuss hearing aid options.  - Utilize \"Good Communication Strategies\" as discussed to assist in speech understanding with communication partners.    Valeria Phelps  Audiologist    Chart CC'd to: Wei Anderson MD      Degree of   Hearing Sensitivity dB Range   Within Normal Limits (WNL) 0 - 20   Mild 20 - 40   Moderate 40 - 55   Moderately-Severe 55 - 70   Severe 70 - 90   Profound 90 +     Portions of this note were dictated using Dragon. There may be linguistic errors secondary to the use of this program.

## 2025-04-25 ENCOUNTER — CARE COORDINATION (OUTPATIENT)
Dept: CARE COORDINATION | Age: 80
End: 2025-04-25

## 2025-04-25 NOTE — CARE COORDINATION
Ambulatory Care Coordination Note     4/25/2025 1:52 PM     Patient outreach attempt by this ACM today to perform care management follow up . ACM was unable to reach the patient by telephone today;   left voice message requesting a return phone call to this ACM.     ACM: Silvina Guerra RN    PCP/Specialist follow up:   Future Appointments         Provider Specialty Dept Phone    5/2/2025 10:40 AM Fahad Tavares MD Primary Care [phone]            Follow Up:   Plan for next ACM outreach in approximately 2 weeks to complete:  - goal progression  - education   - RPM  -review mailings

## 2025-05-07 ENCOUNTER — CARE COORDINATION (OUTPATIENT)
Dept: CARE COORDINATION | Age: 80
End: 2025-05-07

## 2025-05-07 ENCOUNTER — CARE COORDINATION (OUTPATIENT)
Dept: PRIMARY CARE CLINIC | Age: 80
End: 2025-05-07

## 2025-05-07 DIAGNOSIS — I10 ESSENTIAL HYPERTENSION: Primary | ICD-10-CM

## 2025-05-07 NOTE — PROGRESS NOTES
Remote Patient Order Discontinued    Received request from Silvina Guerra RN   to discontinue order for remote patient monitoring of HTN and order completed.

## 2025-05-07 NOTE — CARE COORDINATION
Ambulatory Care Coordination Note     2025 1:18 PM     Patient Current Location:  Home: [address] [place] [address]     ACM contacted the patient by telephone. Verified name and  with patient as identifiers.         ACM: Silvina Guerra RN     Challenges to be reviewed by the provider   Additional needs identified to be addressed with provider No  none               Method of communication with provider: none.    Utilization: Patient has not had any utilization since our last call.     Care Summary Note:     ACM made care coordination outreach and spoke with patient. Patient stated that she is doing \"fine.\" Denied any chest pain, shortness of breath or swelling. Discussed RPM and patient is no longer interested. Patient aware that she will be receiving a call from the RPM team to discuss equipment . Patient reported that she is monitoring her blood pressure at work and denied any concerns at this time. Zone tools reviewed. Denied any issues with appetite or bowels. Medications reviewed. Has all medications filled and is taking as prescribed. Reviewed upcoming appointments. Patient denied any other questions or concerns at this time. Patient agreeable to future care coordination outreaches. ACM to follow up at a later date.     Offered patient enrollment in the Remote Patient Monitoring (RPM) program for in-home monitoring: Patient being discharged from remote patient monitoring program today.    Remote Patient Monitoring Disenrollment      Date/Time:  2025 1:22 PM  Patient Current Location: [place]  Patient has been dis-enrolled from Remote Patient Monitoring program on 2025 due to no longer Interested. Patient has been provided instruction on process to return RPM equipment and RPM has been deactivated.     Patient has ACM's contact information for any further questions, concerns, or needs.      Assessments Completed:   Hypertension - Encounter Level          ,   General Assessment

## 2025-05-07 NOTE — CARE COORDINATION
RPM Kit Return    Lily ARGUELLO Adan  5/7/25    Care Coordination  placed call to Patient  to arrange RPM kit  through UPS.     CCSS spoke to Patient reviewed with Patient how to pack equipment in original packing. Patient aware UPS will  equipment in 2-4 days.   All questions and concerns answered.

## 2025-05-09 ENCOUNTER — OFFICE VISIT (OUTPATIENT)
Dept: PRIMARY CARE CLINIC | Age: 80
End: 2025-05-09

## 2025-05-09 VITALS
TEMPERATURE: 97.9 F | SYSTOLIC BLOOD PRESSURE: 130 MMHG | HEIGHT: 65 IN | HEART RATE: 63 BPM | BODY MASS INDEX: 28.29 KG/M2 | DIASTOLIC BLOOD PRESSURE: 70 MMHG | OXYGEN SATURATION: 99 % | WEIGHT: 169.8 LBS

## 2025-05-09 DIAGNOSIS — I10 ESSENTIAL HYPERTENSION: Primary | ICD-10-CM

## 2025-05-09 DIAGNOSIS — K21.9 GASTROESOPHAGEAL REFLUX DISEASE WITHOUT ESOPHAGITIS: ICD-10-CM

## 2025-05-09 RX ORDER — OMEPRAZOLE 40 MG/1
40 CAPSULE, DELAYED RELEASE ORAL
Qty: 90 CAPSULE | Refills: 2 | Status: SHIPPED | OUTPATIENT
Start: 2025-05-09

## 2025-05-09 RX ORDER — HYDROCHLOROTHIAZIDE 25 MG/1
25 TABLET ORAL DAILY
Qty: 90 TABLET | Refills: 4 | Status: SHIPPED | OUTPATIENT
Start: 2025-05-09

## 2025-05-09 RX ORDER — AMLODIPINE BESYLATE 10 MG/1
10 TABLET ORAL DAILY
Qty: 90 TABLET | Refills: 4 | Status: SHIPPED | OUTPATIENT
Start: 2025-05-09

## 2025-05-09 SDOH — ECONOMIC STABILITY: FOOD INSECURITY: WITHIN THE PAST 12 MONTHS, THE FOOD YOU BOUGHT JUST DIDN'T LAST AND YOU DIDN'T HAVE MONEY TO GET MORE.: NEVER TRUE

## 2025-05-09 SDOH — ECONOMIC STABILITY: FOOD INSECURITY: WITHIN THE PAST 12 MONTHS, YOU WORRIED THAT YOUR FOOD WOULD RUN OUT BEFORE YOU GOT MONEY TO BUY MORE.: NEVER TRUE

## 2025-05-09 ASSESSMENT — PATIENT HEALTH QUESTIONNAIRE - PHQ9
SUM OF ALL RESPONSES TO PHQ QUESTIONS 1-9: 0
1. LITTLE INTEREST OR PLEASURE IN DOING THINGS: NOT AT ALL
2. FEELING DOWN, DEPRESSED OR HOPELESS: NOT AT ALL
SUM OF ALL RESPONSES TO PHQ QUESTIONS 1-9: 0

## 2025-05-09 ASSESSMENT — ENCOUNTER SYMPTOMS
BACK PAIN: 1
CHEST TIGHTNESS: 0
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
## 2025-05-26 ENCOUNTER — APPOINTMENT (OUTPATIENT)
Dept: CT IMAGING | Age: 80
End: 2025-05-26
Payer: MEDICARE

## 2025-05-26 ENCOUNTER — HOSPITAL ENCOUNTER (EMERGENCY)
Age: 80
Discharge: HOME | End: 2025-05-26
Attending: EMERGENCY MEDICINE
Payer: MEDICARE

## 2025-05-26 VITALS
HEART RATE: 70 BPM | OXYGEN SATURATION: 98 % | WEIGHT: 171 LBS | DIASTOLIC BLOOD PRESSURE: 83 MMHG | TEMPERATURE: 98 F | HEIGHT: 65 IN | RESPIRATION RATE: 16 BRPM | BODY MASS INDEX: 28.49 KG/M2 | SYSTOLIC BLOOD PRESSURE: 156 MMHG

## 2025-05-26 DIAGNOSIS — N30.00 ACUTE CYSTITIS WITHOUT HEMATURIA: ICD-10-CM

## 2025-05-26 DIAGNOSIS — M54.50 ACUTE BILATERAL LOW BACK PAIN WITHOUT SCIATICA: Primary | ICD-10-CM

## 2025-05-26 LAB
ALBUMIN SERPL-MCNC: 4.2 G/DL (ref 3.4–5)
ALBUMIN/GLOB SERPL: 1.3 {RATIO} (ref 1.1–2.2)
ALP SERPL-CCNC: 93 U/L (ref 40–129)
ALT SERPL-CCNC: 11 U/L (ref 10–40)
ANION GAP SERPL CALCULATED.3IONS-SCNC: 9 MMOL/L (ref 3–16)
AST SERPL-CCNC: 20 U/L (ref 15–37)
BACTERIA URNS QL MICRO: ABNORMAL /HPF
BASOPHILS # BLD: 0 K/UL (ref 0–0.2)
BASOPHILS NFR BLD: 0.2 %
BILIRUB SERPL-MCNC: 0.3 MG/DL (ref 0–1)
BILIRUB UR QL STRIP.AUTO: NEGATIVE
BUN SERPL-MCNC: 18 MG/DL (ref 7–20)
CALCIUM SERPL-MCNC: 10 MG/DL (ref 8.3–10.6)
CHLORIDE SERPL-SCNC: 105 MMOL/L (ref 99–110)
CLARITY UR: CLEAR
CO2 SERPL-SCNC: 27 MMOL/L (ref 21–32)
COLOR UR: YELLOW
CREAT SERPL-MCNC: 0.9 MG/DL (ref 0.6–1.2)
DEPRECATED RDW RBC AUTO: 15.1 % (ref 12.4–15.4)
EOSINOPHIL # BLD: 0 K/UL (ref 0–0.6)
EOSINOPHIL NFR BLD: 0.6 %
EPI CELLS #/AREA URNS AUTO: 1 /HPF (ref 0–5)
GFR SERPLBLD CREATININE-BSD FMLA CKD-EPI: 64 ML/MIN/{1.73_M2}
GLUCOSE SERPL-MCNC: 92 MG/DL (ref 70–99)
GLUCOSE UR STRIP.AUTO-MCNC: NEGATIVE MG/DL
HCT VFR BLD AUTO: 38.7 % (ref 36–48)
HGB BLD-MCNC: 13 G/DL (ref 12–16)
HGB UR QL STRIP.AUTO: NEGATIVE
HYALINE CASTS #/AREA URNS AUTO: 0 /LPF (ref 0–8)
KETONES UR STRIP.AUTO-MCNC: NEGATIVE MG/DL
LEUKOCYTE ESTERASE UR QL STRIP.AUTO: ABNORMAL
LYMPHOCYTES # BLD: 2.4 K/UL (ref 1–5.1)
LYMPHOCYTES NFR BLD: 36.8 %
MCH RBC QN AUTO: 28.5 PG (ref 26–34)
MCHC RBC AUTO-ENTMCNC: 33.5 G/DL (ref 31–36)
MCV RBC AUTO: 85.2 FL (ref 80–100)
MONOCYTES # BLD: 0.6 K/UL (ref 0–1.3)
MONOCYTES NFR BLD: 9.3 %
NEUTROPHILS # BLD: 3.4 K/UL (ref 1.7–7.7)
NEUTROPHILS NFR BLD: 53.1 %
NITRITE UR QL STRIP.AUTO: NEGATIVE
PH UR STRIP.AUTO: 6.5 [PH] (ref 5–8)
PLATELET # BLD AUTO: 207 K/UL (ref 135–450)
PMV BLD AUTO: 9.2 FL (ref 5–10.5)
POTASSIUM SERPL-SCNC: 4.2 MMOL/L (ref 3.5–5.1)
PROT SERPL-MCNC: 7.5 G/DL (ref 6.4–8.2)
PROT UR STRIP.AUTO-MCNC: NEGATIVE MG/DL
RBC # BLD AUTO: 4.54 M/UL (ref 4–5.2)
RBC CLUMPS #/AREA URNS AUTO: 1 /HPF (ref 0–4)
SODIUM SERPL-SCNC: 141 MMOL/L (ref 136–145)
SP GR UR STRIP.AUTO: >=1.03 (ref 1–1.03)
UA COMPLETE W REFLEX CULTURE PNL UR: ABNORMAL
UA DIPSTICK W REFLEX MICRO PNL UR: YES
URN SPEC COLLECT METH UR: ABNORMAL
UROBILINOGEN UR STRIP-ACNC: 1 E.U./DL
WBC # BLD AUTO: 6.4 K/UL (ref 4–11)
WBC #/AREA URNS AUTO: 6 /HPF (ref 0–5)

## 2025-05-26 PROCEDURE — 6360000004 HC RX CONTRAST MEDICATION: Performed by: EMERGENCY MEDICINE

## 2025-05-26 PROCEDURE — 85025 COMPLETE CBC W/AUTO DIFF WBC: CPT

## 2025-05-26 PROCEDURE — 99285 EMERGENCY DEPT VISIT HI MDM: CPT

## 2025-05-26 PROCEDURE — 81001 URINALYSIS AUTO W/SCOPE: CPT

## 2025-05-26 PROCEDURE — 74177 CT ABD & PELVIS W/CONTRAST: CPT

## 2025-05-26 PROCEDURE — 93005 ELECTROCARDIOGRAM TRACING: CPT | Performed by: EMERGENCY MEDICINE

## 2025-05-26 PROCEDURE — 6370000000 HC RX 637 (ALT 250 FOR IP): Performed by: EMERGENCY MEDICINE

## 2025-05-26 PROCEDURE — 80053 COMPREHEN METABOLIC PANEL: CPT

## 2025-05-26 RX ORDER — IOPAMIDOL 755 MG/ML
75 INJECTION, SOLUTION INTRAVASCULAR
Status: COMPLETED | OUTPATIENT
Start: 2025-05-26 | End: 2025-05-26

## 2025-05-26 RX ORDER — CEFUROXIME AXETIL 250 MG/1
500 TABLET ORAL ONCE
Status: COMPLETED | OUTPATIENT
Start: 2025-05-26 | End: 2025-05-26

## 2025-05-26 RX ORDER — CEFUROXIME AXETIL 250 MG/1
250 TABLET ORAL 2 TIMES DAILY
Qty: 14 TABLET | Refills: 0 | Status: SHIPPED | OUTPATIENT
Start: 2025-05-26 | End: 2025-06-02

## 2025-05-26 RX ORDER — ACETAMINOPHEN 500 MG
1000 TABLET ORAL ONCE
Status: COMPLETED | OUTPATIENT
Start: 2025-05-26 | End: 2025-05-26

## 2025-05-26 RX ADMIN — IOPAMIDOL 75 ML: 755 INJECTION, SOLUTION INTRAVENOUS at 16:25

## 2025-05-26 RX ADMIN — CEFUROXIME AXETIL 500 MG: 250 TABLET, FILM COATED ORAL at 18:27

## 2025-05-26 RX ADMIN — ACETAMINOPHEN 1000 MG: 500 TABLET ORAL at 15:22

## 2025-05-26 ASSESSMENT — PAIN DESCRIPTION - DESCRIPTORS
DESCRIPTORS: DISCOMFORT
DESCRIPTORS: DISCOMFORT

## 2025-05-26 ASSESSMENT — PAIN SCALES - GENERAL
PAINLEVEL_OUTOF10: 7
PAINLEVEL_OUTOF10: 8

## 2025-05-26 ASSESSMENT — PAIN DESCRIPTION - LOCATION
LOCATION: ABDOMEN;BACK;FLANK
LOCATION: ABDOMEN;FLANK

## 2025-05-26 ASSESSMENT — ENCOUNTER SYMPTOMS
RESPIRATORY NEGATIVE: 1
ABDOMINAL PAIN: 1
SHORTNESS OF BREATH: 0
BACK PAIN: 1
SORE THROAT: 0

## 2025-05-26 ASSESSMENT — PAIN - FUNCTIONAL ASSESSMENT: PAIN_FUNCTIONAL_ASSESSMENT: 0-10

## 2025-05-26 NOTE — ED PROVIDER NOTES
time, the chance of SEA or SCC is so remote that additional testing or imaging is more likely to harm the patient than diagnose SEA. [id number]    SHARED DECISION MAKING:   I discussed my risk assessment with the patient. The patient understands and consents to the risk of disposition/plan, as well as the risk of uncertainty in estimating outcomes. [id number]               Patient was given the following medications:  Medications   acetaminophen (TYLENOL) tablet 1,000 mg (1,000 mg Oral Given 5/26/25 1522)   iopamidol (ISOVUE-370) 76 % injection 75 mL (75 mLs IntraVENous Given 5/26/25 1625)   cefUROXime (CEFTIN) tablet 500 mg (500 mg Oral Given 5/26/25 1827)       Disposition Considerations (tests considered but not done, Shared Decision Making, Pt Expectation of Test or Tx.): Shared decision making with this patient: Initial differential diagnoses were discussed with this patient, along with physical exam findings and an explanation what evaluation studies were necessary and why. Labs and Imaging results were explained to the patient in detail, including explanation of what these results mean. All treatment and disposition options including admission or discharge were discussed with the patient and a treatment plan with the patient's best short and long term care was made in collaboration with the patient.         Appropriate for outpatient management      The patient is at low risk for mortality based on demographic, history and clinical factors. Given the best available information and clinical assessment, I estimate the risk of hospitalization to be greater than risk of treatment at home. I have explained to the patient that the risk could rapidly change, given precautions for return and instructions. Explained to patient that the risk for mortality is low based on demographic, history and clinical factors.      I discussed with patient the results of evaluation in the ED, diagnosis, care, and

## 2025-05-27 ENCOUNTER — CARE COORDINATION (OUTPATIENT)
Dept: CARE COORDINATION | Age: 80
End: 2025-05-27

## 2025-05-27 NOTE — CARE COORDINATION
Ambulatory Care Coordination Note     5/27/2025 12:54 PM     Patient outreach attempt by this ACM today to perform care management follow up  and perform hospital follow up. ACM was unable to reach the patient by telephone today;   left voice message requesting a return phone call to this ACM.     ACM: Silvina Guerra RN     Care Summary Note: ED follow up     PCP/Specialist follow up:       Follow Up:   Plan for next ACM outreach in approximately 1-2 days  to complete:  - goal progression  - education   - hospital follow up.

## 2025-05-28 ENCOUNTER — CARE COORDINATION (OUTPATIENT)
Dept: CARE COORDINATION | Age: 80
End: 2025-05-28

## 2025-05-28 LAB
EKG ATRIAL RATE: 71 BPM
EKG DIAGNOSIS: NORMAL
EKG P AXIS: 28 DEGREES
EKG P-R INTERVAL: 158 MS
EKG Q-T INTERVAL: 382 MS
EKG QRS DURATION: 78 MS
EKG QTC CALCULATION (BAZETT): 415 MS
EKG R AXIS: -2 DEGREES
EKG T AXIS: 8 DEGREES
EKG VENTRICULAR RATE: 71 BPM

## 2025-05-28 PROCEDURE — 93010 ELECTROCARDIOGRAM REPORT: CPT | Performed by: INTERNAL MEDICINE

## 2025-05-28 NOTE — CARE COORDINATION
Ambulatory Care Coordination Note     2025 10:44 AM     Patient Current Location:  Home: [address] [place], [place] [id number]     ACM contacted the patient by telephone. Verified name and  with patient as identifiers.         ACM: Silvina Guerra RN     Challenges to be reviewed by the provider   Additional needs identified to be addressed with provider : Yes                  Method of communication with provider: chart routing.    Utilization: Has the patient been seen in the ED since your last call? Yes,   Discharge Date: 25   Discharge Facility: [facility]  Reason for ED Visit: back pain  Visit Diagnosis: Acute bilateral low back pain  without sciatica    Number of ED visits in the last 6 months: 3      Do you have any ongoing symptoms? Yes, there has been no change in my symptoms.   Current symptoms: back pain.    Did you call your PCP prior to going to the ED? No, did not call the PCP office.     Review of Discharge Instructions:   [x] AVS discharge instructions  [x] Right Care, Right Place, Right Time document  [x] Medication changes  [x] Follow up appointments  [] Referral follow up   []        Care Summary Note:     ACM made care coordination outreach and spoke with patient related to ED follow up. Patient stated that she went to the ED for back pain. Continuing to have back pain. Asking for pain medication. Will route to PCP. AVS reviewed. Patient picked up antibiotic and is taking as prescribed. Aware to take entire prescription regardless if feeling better. Scheduled to see PCP tomorrow. Drinking lots of water and cranberry juice. Denied any chest pain, shortness of breath or swelling. Patient stated that she has all of her medications filled and is taking them as prescribed. Patient stated no other questions or concerns at the end of the call. Patient provided with [facility] phone number to call for any non-emergent questions. Patient agreeable to future care coordination

## 2025-05-29 ENCOUNTER — OFFICE VISIT (OUTPATIENT)
Dept: PRIMARY CARE CLINIC | Age: 80
End: 2025-05-29
Payer: MEDICARE

## 2025-05-29 VITALS
DIASTOLIC BLOOD PRESSURE: 70 MMHG | HEIGHT: 65 IN | SYSTOLIC BLOOD PRESSURE: 120 MMHG | OXYGEN SATURATION: 100 % | TEMPERATURE: 98.6 F | HEART RATE: 75 BPM | WEIGHT: 171.2 LBS | BODY MASS INDEX: 28.52 KG/M2

## 2025-05-29 DIAGNOSIS — I10 ESSENTIAL HYPERTENSION: ICD-10-CM

## 2025-05-29 DIAGNOSIS — S39.012D STRAIN OF LUMBAR REGION, SUBSEQUENT ENCOUNTER: Primary | ICD-10-CM

## 2025-05-29 PROCEDURE — 1123F ACP DISCUSS/DSCN MKR DOCD: CPT | Performed by: INTERNAL MEDICINE

## 2025-05-29 PROCEDURE — 1036F TOBACCO NON-USER: CPT | Performed by: INTERNAL MEDICINE

## 2025-05-29 PROCEDURE — 3078F DIAST BP <80 MM HG: CPT | Performed by: INTERNAL MEDICINE

## 2025-05-29 PROCEDURE — 3074F SYST BP LT 130 MM HG: CPT | Performed by: INTERNAL MEDICINE

## 2025-05-29 PROCEDURE — G8427 DOCREV CUR MEDS BY ELIG CLIN: HCPCS | Performed by: INTERNAL MEDICINE

## 2025-05-29 PROCEDURE — 1159F MED LIST DOCD IN RCRD: CPT | Performed by: INTERNAL MEDICINE

## 2025-05-29 PROCEDURE — 99214 OFFICE O/P EST MOD 30 MIN: CPT | Performed by: INTERNAL MEDICINE

## 2025-05-29 PROCEDURE — G8399 PT W/DXA RESULTS DOCUMENT: HCPCS | Performed by: INTERNAL MEDICINE

## 2025-05-29 PROCEDURE — G8419 CALC BMI OUT NRM PARAM NOF/U: HCPCS | Performed by: INTERNAL MEDICINE

## 2025-05-29 PROCEDURE — 1090F PRES/ABSN URINE INCON ASSESS: CPT | Performed by: INTERNAL MEDICINE

## 2025-05-29 RX ORDER — LIDOCAINE 50 MG/G
1-2 PATCH TOPICAL DAILY
Qty: 20 PATCH | Refills: 0 | Status: SHIPPED | OUTPATIENT
Start: 2025-05-29 | End: 2025-06-08

## 2025-05-29 RX ORDER — PREDNISONE 20 MG/1
40 TABLET ORAL DAILY
Qty: 12 TABLET | Refills: 0 | Status: SHIPPED | OUTPATIENT
Start: 2025-05-29 | End: 2025-06-04

## 2025-05-29 RX ORDER — TIZANIDINE 2 MG/1
2 TABLET ORAL 3 TIMES DAILY PRN
Qty: 21 TABLET | Refills: 0 | Status: SHIPPED | OUTPATIENT
Start: 2025-05-29 | End: 2025-06-05

## 2025-05-29 SDOH — ECONOMIC STABILITY: FOOD INSECURITY: WITHIN THE PAST 12 MONTHS, YOU WORRIED THAT YOUR FOOD WOULD RUN OUT BEFORE YOU GOT MONEY TO BUY MORE.: NEVER TRUE

## 2025-05-29 SDOH — ECONOMIC STABILITY: FOOD INSECURITY: WITHIN THE PAST 12 MONTHS, THE FOOD YOU BOUGHT JUST DIDN'T LAST AND YOU DIDN'T HAVE MONEY TO GET MORE.: NEVER TRUE

## 2025-05-29 ASSESSMENT — ENCOUNTER SYMPTOMS
BACK PAIN: 1
CHEST TIGHTNESS: 0
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
## 2025-05-30 ENCOUNTER — TELEPHONE (OUTPATIENT)
Dept: ADMINISTRATIVE | Age: 80
End: 2025-05-30

## 2025-05-30 NOTE — TELEPHONE ENCOUNTER
The medication was DENIED; DENIAL letter is uploaded to MEDIA.    Generic Denial:  Other; please see Denial Letter.     Note :    You asked for the drug above for your Strain of muscle, fascia and tendon of lower back, subsequent encounter and acute back pain. This is an off-label use that is not medically accepted.         If this requires a response please respond to the pool ( P MHCX PSC MEDICATION PRE-AUTH).      Thank you please advise patient.

## 2025-05-30 NOTE — TELEPHONE ENCOUNTER
Submitted PA for lidocaine (LIDODERM) 5 %   Via CMM (Key: [id number]) STATUS: PENDING.    Follow up done daily; if no decision with in three days we will refax.  If another three days goes by with no decision will call the insurance for status.

## 2025-06-09 ENCOUNTER — CARE COORDINATION (OUTPATIENT)
Dept: CARE COORDINATION | Age: 80
End: 2025-06-09

## 2025-06-09 NOTE — CARE COORDINATION
Ambulatory Care Coordination Note     2025 3:28 PM     Patient Current Location:  Home: [address] [place], [place] [phone]     ACM contacted the patient by telephone. Verified name and  with patient as identifiers.         ACM: Silvina Guerra RN     Challenges to be reviewed by the provider   Additional needs identified to be addressed with provider No                 Method of communication with provider: none.    Utilization: Patient has not had any utilization since our last call.     Care Summary Note:     ACM made care coordination outreach and spoke with patient. Patient very pleasant on the phone while conversing with ACM. Patient reported that she is feeling \"good.\" Denied any chest pain, shortness of breath or swelling. Reported that her back pain is doing \"okay.\" Reviewed recent PCP appointment and AVS. Completed steroids. Denied any medication questions or concerns at this time. Denied any HTN concerns at this time. Denied any concerns with appetite or bowels. Patient stated that she has all of her medications filled and is taking them as prescribed. Patient stated no other questions or concerns at the end of the call. Patient provided with [facility] phone number to call for any non-emergent questions. Patient agreeable to future care coordination calls. ACM will follow up at a later time.      Offered patient enrollment in the Remote Patient Monitoring (RPM) program for in-home monitoring: Yes, but did not enroll at this time: already monitoring with home equipment.     Assessments Completed:   Hypertension - Encounter Level          ,   General Assessment    Do you have any symptoms that are causing concern?: No          Medications Reviewed:   Completed during a previous call     Advance Care Planning:   Reviewed during previous call      Care Planning:   Education Documentation  Influenza Vaccine, taught by Silvina Guerra RN at 2025  3:29 PM.  Learner: Patient  Readiness: Acceptance  Method:

## 2025-06-23 ENCOUNTER — RESULTS FOLLOW-UP (OUTPATIENT)
Dept: PRIMARY CARE CLINIC | Age: 80
End: 2025-06-23

## 2025-06-27 ENCOUNTER — OFFICE VISIT (OUTPATIENT)
Dept: PRIMARY CARE CLINIC | Age: 80
End: 2025-06-27

## 2025-06-27 VITALS
BODY MASS INDEX: 28.22 KG/M2 | HEIGHT: 65 IN | TEMPERATURE: 97.9 F | SYSTOLIC BLOOD PRESSURE: 125 MMHG | HEART RATE: 69 BPM | WEIGHT: 169.4 LBS | DIASTOLIC BLOOD PRESSURE: 70 MMHG | OXYGEN SATURATION: 100 %

## 2025-06-27 DIAGNOSIS — E55.9 VITAMIN D DEFICIENCY: ICD-10-CM

## 2025-06-27 DIAGNOSIS — Z29.11 NEED FOR RSV VACCINATION: Primary | ICD-10-CM

## 2025-06-27 DIAGNOSIS — M15.9 GENERALIZED OA: ICD-10-CM

## 2025-06-27 DIAGNOSIS — Z23 NEED FOR COVID-19 VACCINE: ICD-10-CM

## 2025-06-27 DIAGNOSIS — I10 ESSENTIAL HYPERTENSION: ICD-10-CM

## 2025-06-27 RX ORDER — ERGOCALCIFEROL 1.25 MG/1
50000 CAPSULE, LIQUID FILLED ORAL WEEKLY
Qty: 12 CAPSULE | Refills: 4 | Status: SHIPPED | OUTPATIENT
Start: 2025-06-27

## 2025-06-27 RX ORDER — HYDROCHLOROTHIAZIDE 25 MG/1
25 TABLET ORAL DAILY
Qty: 90 TABLET | Refills: 4 | Status: SHIPPED | OUTPATIENT
Start: 2025-06-27

## 2025-06-27 RX ORDER — ACETAMINOPHEN 500 MG
500 TABLET ORAL EVERY 6 HOURS PRN
Qty: 90 TABLET | Refills: 1 | Status: SHIPPED | OUTPATIENT
Start: 2025-06-27

## 2025-06-27 RX ORDER — AMLODIPINE BESYLATE 10 MG/1
10 TABLET ORAL DAILY
Qty: 90 TABLET | Refills: 4 | Status: SHIPPED | OUTPATIENT
Start: 2025-06-27

## 2025-06-27 SDOH — ECONOMIC STABILITY: FOOD INSECURITY: WITHIN THE PAST 12 MONTHS, THE FOOD YOU BOUGHT JUST DIDN'T LAST AND YOU DIDN'T HAVE MONEY TO GET MORE.: NEVER TRUE

## 2025-06-27 SDOH — ECONOMIC STABILITY: FOOD INSECURITY: WITHIN THE PAST 12 MONTHS, YOU WORRIED THAT YOUR FOOD WOULD RUN OUT BEFORE YOU GOT MONEY TO BUY MORE.: NEVER TRUE

## 2025-06-27 ASSESSMENT — PATIENT HEALTH QUESTIONNAIRE - PHQ9
SUM OF ALL RESPONSES TO PHQ QUESTIONS 1-9: 0
2. FEELING DOWN, DEPRESSED OR HOPELESS: NOT AT ALL
SUM OF ALL RESPONSES TO PHQ QUESTIONS 1-9: 0
SUM OF ALL RESPONSES TO PHQ QUESTIONS 1-9: 0
1. LITTLE INTEREST OR PLEASURE IN DOING THINGS: NOT AT ALL
SUM OF ALL RESPONSES TO PHQ QUESTIONS 1-9: 0

## 2025-06-27 ASSESSMENT — ENCOUNTER SYMPTOMS: CHEST TIGHTNESS: 0
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
## 2025-07-02 ENCOUNTER — CARE COORDINATION (OUTPATIENT)
Dept: CARE COORDINATION | Age: 80
End: 2025-07-02

## 2025-07-02 NOTE — CARE COORDINATION
Ambulatory Care Coordination Note     2025 10:41 AM     Patient Current Location:  Home: [address] [place], [place] [id number]     ACM contacted the patient by telephone. Verified name and  with patient as identifiers.     Patient graduated from the High Risk Care Management program on 2025.  Patient verbalizes confidence in the ability to self-manage at this time..  Care management goals have been completed. No further Ambulatory Care Manager follow up scheduled.      ACM: Silvina Guerra RN     Challenges to be reviewed by the provider   Additional needs identified to be addressed with provider No                 Method of communication with provider: chart routing.    Utilization: Patient has not had any utilization since our last call.     Care Summary Note:     ACM made final care coordination outreach and spoke with patient. Patient very pleasant on the phone while conversing with ACM. Patient reported that she is doing \"good.\" Denied any chest pain, shortness of breath or swelling. Reported that her blood pressure has been good. Reviewed zone tools. Discussed recent PCP appointment. Aware of PCP appointment in December. Denied any concerns with appetite or bowels. Reported that all medications are filled and she is taking as prescribed. Discussed setting alarms for medication reminders if needed. Patient denied any other questions or concerns at this time. Discussed with patient signs and symptoms to monitor and importance of reporting concerns to appropriate site of care for early intervention. Patient has VU. Patient is aware of provider on call services for non emergent after hours questions and to report to ED with RED flags concerns.       Offered patient enrollment in the Remote Patient Monitoring (RPM) program for in-home monitoring: Yes, but did not enroll at this time: already monitoring with home equipment.     Assessments Completed:   Hypertension - Encounter Level          ,   General